# Patient Record
Sex: FEMALE | Race: WHITE | NOT HISPANIC OR LATINO | Employment: UNEMPLOYED | URBAN - METROPOLITAN AREA
[De-identification: names, ages, dates, MRNs, and addresses within clinical notes are randomized per-mention and may not be internally consistent; named-entity substitution may affect disease eponyms.]

---

## 2019-10-20 ENCOUNTER — HOSPITAL ENCOUNTER (EMERGENCY)
Facility: MEDICAL CENTER | Age: 39
End: 2019-10-20
Attending: EMERGENCY MEDICINE
Payer: MEDICAID

## 2019-10-20 VITALS
HEART RATE: 80 BPM | TEMPERATURE: 98 F | WEIGHT: 141.09 LBS | HEIGHT: 64 IN | SYSTOLIC BLOOD PRESSURE: 131 MMHG | OXYGEN SATURATION: 98 % | RESPIRATION RATE: 16 BRPM | BODY MASS INDEX: 24.09 KG/M2 | DIASTOLIC BLOOD PRESSURE: 95 MMHG

## 2019-10-20 DIAGNOSIS — Z76.0 MEDICATION REFILL: ICD-10-CM

## 2019-10-20 PROCEDURE — 99283 EMERGENCY DEPT VISIT LOW MDM: CPT

## 2019-10-20 RX ORDER — LISINOPRIL 20 MG/1
20 TABLET ORAL EVERY EVENING
Qty: 30 TAB | Refills: 0 | Status: SHIPPED | OUTPATIENT
Start: 2019-10-20 | End: 2019-11-19

## 2019-10-20 RX ORDER — LISINOPRIL 20 MG/1
20 TABLET ORAL EVERY EVENING
Status: SHIPPED | COMMUNITY
End: 2019-10-20 | Stop reason: SDUPTHER

## 2019-10-20 RX ORDER — HYDROCHLOROTHIAZIDE 25 MG/1
25 TABLET ORAL EVERY EVENING
Qty: 30 TAB | Refills: 0 | Status: SHIPPED | OUTPATIENT
Start: 2019-10-20 | End: 2019-11-19

## 2019-10-20 RX ORDER — HYDROCHLOROTHIAZIDE 25 MG/1
25 TABLET ORAL EVERY EVENING
Status: SHIPPED | COMMUNITY
End: 2019-10-20 | Stop reason: SDUPTHER

## 2019-10-20 SDOH — HEALTH STABILITY: MENTAL HEALTH: HOW OFTEN DO YOU HAVE A DRINK CONTAINING ALCOHOL?: NEVER

## 2019-10-20 NOTE — ED NOTES
Med rec updated and complete  Allergies reviewed  Pt reports no vitamins or OTC's.  Pt reports no antibiotics in the last 2 weeks  Pt is not sure what pharmacy to go too, just moved here.

## 2019-10-20 NOTE — ED NOTES
Pt cleared for d/c  dischg instructions given to pt  Rx lisinopril and HCTZ given  Pt aware to fill and take as prescribed  D/c'ed to home in NAD  To f/u w/ PCP for further needs

## 2019-10-20 NOTE — ED PROVIDER NOTES
"ED Provider Note    CHIEF COMPLAINT  Chief Complaint   Patient presents with   • Medication Refill       HPI  Saul Conde is a 39 y.o. female who presents for medication refill.  She ran out of her medications last night she is out of lisinopril 20 mg and hydrochlorthiazide 25 mg.  She has been on these medications for many years and is planning on following up with her primary care doctor.  She is in a \"program\" that helps her get on Medicaid and get a doctor.  She does get out of snf recently.  She has no symptoms otherwise      REVIEW OF SYSTEMS  Positive for medication refill, negative for chest pain shortness of breath.     PAST MEDICAL HISTORY   has a past medical history of Hypertension, Hypertension, and Pseudotumor cerebri.    SOCIAL HISTORY  Social History     Tobacco Use   • Smoking status: Never Smoker   • Smokeless tobacco: Never Used   Substance and Sexual Activity   • Alcohol use: Never     Frequency: Never   • Drug use: Never   • Sexual activity: Not on file       SURGICAL HISTORY  patient denies any surgical history    CURRENT MEDICATIONS  Home Medications     Reviewed by Cirilo Mckenzie (Pharmacy Tech) on 10/20/19 at 1608  Med List Status: Complete   Medication Last Dose Status   hydroCHLOROthiazide (HYDRODIURIL) 25 MG Tab 10/19/2019 Active   lisinopril (PRINIVIL) 20 MG Tab 10/19/2019 Active                ALLERGIES  Allergies   Allergen Reactions   • Iodine Anaphylaxis     Contrast with Iodine       PHYSICAL EXAM  VITAL SIGNS: /96   Pulse 80   Resp 18   Ht 1.626 m (5' 4\")   Wt 64 kg (141 lb 1.5 oz)   LMP 09/20/2019 (Approximate)   SpO2 99%   BMI 24.22 kg/m²   Constitutional: Alert in no apparent distress. Well apearing  HENT: Normocephalic, Atraumatic, Bilateral external ears normal. Nose normal.   Eyes:  Conjunctiva normal, non-icteric.   Lungs: Non-labored respirations  Skin: Warm, Dry, No erythema, No rash.   Neurologic: Alert, Grossly non-focal.   Psychiatric: " Affect normal, Judgment normal, Mood normal, Appears appropriate and not intoxicated.         DIAGNOSTIC STUDIES / PROCEDURES      COURSE & MEDICAL DECISION MAKING  Pertinent Labs & Imaging studies reviewed. (See chart for details)  This is a 39-year-old who presents for medication refill.  I will refill her medications she has a plan to get a primary care doctor within the month and will be able to get them filled that way in the future.     The patient will return for new or worsening symptoms and is stable at the time of discharge. Patient was given return precautions. Patient and/or family member verbalizes understanding and will comply.    DISPOSITION:  Patient will be discharged home in stable condition.    FOLLOW UP:  No follow-up provider specified.      OUTPATIENT MEDICATIONS:  Current Discharge Medication List              FINAL IMPRESSION  1. Medication refill         2.   3.     This dictation has been creating using voice recognition software. The accuracy of the dictation is limited the abilities of the software.  I expect there may be some errors of grammar and possibly content. I made every attempt to manually correct the errors within my dictation. However errors related to this voice recognition software may still exist and should be interpreted within the appropriate context.        The note accurately reflects work and decisions made by me.  Anya Bauer  10/20/2019  4:12 PM

## 2019-10-20 NOTE — ED TRIAGE NOTES
"Pt requests a refill for Lisinopril, and HCTZ.  She is current on her medication intake up to yesterday. Denies any adverse symptomatology.  She reports a hx of Pseudotumor Cerebri, and HTN.   Chief Complaint   Patient presents with   • Medication Refill     /96   Pulse 80   Resp 18   Ht 1.626 m (5' 4\")   Wt 64 kg (141 lb 1.5 oz)   LMP 09/20/2019 (Approximate)   SpO2 99%   BMI 24.22 kg/m²     "

## 2020-06-05 ENCOUNTER — HOSPITAL ENCOUNTER (EMERGENCY)
Facility: MEDICAL CENTER | Age: 40
End: 2020-06-05
Attending: EMERGENCY MEDICINE
Payer: MEDICAID

## 2020-06-05 VITALS
DIASTOLIC BLOOD PRESSURE: 103 MMHG | WEIGHT: 150 LBS | TEMPERATURE: 97.9 F | HEART RATE: 94 BPM | OXYGEN SATURATION: 97 % | BODY MASS INDEX: 25.61 KG/M2 | HEIGHT: 64 IN | SYSTOLIC BLOOD PRESSURE: 144 MMHG | RESPIRATION RATE: 17 BRPM

## 2020-06-05 DIAGNOSIS — I10 ESSENTIAL HYPERTENSION: ICD-10-CM

## 2020-06-05 DIAGNOSIS — G44.209 ACUTE NON INTRACTABLE TENSION-TYPE HEADACHE: ICD-10-CM

## 2020-06-05 DIAGNOSIS — G93.2 PSEUDOTUMOR CEREBRI: ICD-10-CM

## 2020-06-05 PROCEDURE — 700111 HCHG RX REV CODE 636 W/ 250 OVERRIDE (IP): Performed by: EMERGENCY MEDICINE

## 2020-06-05 PROCEDURE — 96372 THER/PROPH/DIAG INJ SC/IM: CPT

## 2020-06-05 PROCEDURE — 99284 EMERGENCY DEPT VISIT MOD MDM: CPT

## 2020-06-05 PROCEDURE — 700102 HCHG RX REV CODE 250 W/ 637 OVERRIDE(OP): Performed by: EMERGENCY MEDICINE

## 2020-06-05 PROCEDURE — A9270 NON-COVERED ITEM OR SERVICE: HCPCS | Performed by: EMERGENCY MEDICINE

## 2020-06-05 RX ORDER — LISINOPRIL 20 MG/1
20 TABLET ORAL DAILY
Qty: 30 TAB | Refills: 1 | Status: SHIPPED | OUTPATIENT
Start: 2020-06-05 | End: 2022-10-21

## 2020-06-05 RX ORDER — ACETAZOLAMIDE 250 MG/1
250 TABLET ORAL ONCE
Status: COMPLETED | OUTPATIENT
Start: 2020-06-05 | End: 2020-06-05

## 2020-06-05 RX ORDER — LISINOPRIL 20 MG/1
20 TABLET ORAL DAILY
COMMUNITY
End: 2020-06-05 | Stop reason: SDUPTHER

## 2020-06-05 RX ORDER — HYDROCHLOROTHIAZIDE 25 MG/1
25 TABLET ORAL DAILY
COMMUNITY
End: 2020-06-05 | Stop reason: SDUPTHER

## 2020-06-05 RX ORDER — ACETAZOLAMIDE 250 MG/1
250 TABLET ORAL 2 TIMES DAILY
Qty: 60 TAB | Refills: 1 | Status: SHIPPED | OUTPATIENT
Start: 2020-06-05 | End: 2022-10-21

## 2020-06-05 RX ORDER — KETOROLAC TROMETHAMINE 30 MG/ML
60 INJECTION, SOLUTION INTRAMUSCULAR; INTRAVENOUS ONCE
Status: DISCONTINUED | OUTPATIENT
Start: 2020-06-05 | End: 2020-06-05

## 2020-06-05 RX ORDER — KETOROLAC TROMETHAMINE 30 MG/ML
60 INJECTION, SOLUTION INTRAMUSCULAR; INTRAVENOUS ONCE
Status: COMPLETED | OUTPATIENT
Start: 2020-06-05 | End: 2020-06-05

## 2020-06-05 RX ORDER — HYDROCHLOROTHIAZIDE 25 MG/1
25 TABLET ORAL DAILY
Qty: 30 TAB | Refills: 1 | Status: SHIPPED | OUTPATIENT
Start: 2020-06-05 | End: 2022-10-21

## 2020-06-05 RX ADMIN — ACETAZOLAMIDE 250 MG: 250 TABLET ORAL at 11:06

## 2020-06-05 RX ADMIN — KETOROLAC TROMETHAMINE 60 MG: 30 INJECTION, SOLUTION INTRAMUSCULAR at 10:51

## 2020-06-05 NOTE — ED TRIAGE NOTES
"Chief Complaint   Patient presents with   • Headache     BIB [facility] from home for headache for a couple of days. Patient started feeling like \"I was having a hot flash, I was really warm and sweaty after I stood up.\"     /107   Pulse 91   Temp 36 °C (96.8 °F) (Temporal)   Resp 16   Ht 1.626 m (5' 4\")   Wt 68 kg (150 lb)   SpO2 100%   BMI 25.75 kg/m²     "

## 2020-06-05 NOTE — ED PROVIDER NOTES
"ED Provider Note    Scribed for James Santamaria M.D. by Sukhjinder Salcedo. 6/5/2020  10:07 AM    Means of arrival: EMS  History obtained from: Patient  History limited by: None    CHIEF COMPLAINT  Chief Complaint   Patient presents with   • Headache     BIB [facility] from home for headache for a couple of days. Patient started feeling like \"I was having a hot flash, I was really warm and sweaty after I stood up.\"       HPI  Saul Conde is a 39 y.o. female with a history of hypertension who presents to the Emergency Department complaining of a gradual onset 6/10 squeezing headache that began about 24 hours prior to my exam. The headache began about an hour after riding a bike in the heat and describes prodromal diaphoresis. The headache is localized to the top of her head and is worsening despite taking six Tylenol over the past 24 hours. It did improve for a short period of time at around 12:00 AM. The patient complains of persistent chills, diaphoresis, and fatigue. She denies fever, vision changes, light sensitivity,  nausea, vomiting, dental pain, jaw pain, calf pain, focal weakness or numbness. She is compliant with her lisinopril and HCTZ, although, she stop taking her Diamox 250 mg BID, prescribed for her pseudotumor cerebri, several months ago as she could no longer afford it. She describes chronic lower extremity swelling secondary to her pseudotumor. Her pseudotumor cerebri diagnosed in 1998. No family history of pseudotumor cerebri or migraines. Her last brain MRI and lumbar puncture were [place] many years ago. She is not followed by a Neurologist in [place]. No personal or family history of DVT or PE. No known COVID or other sick contacts. She had her influenza vaccine this year.    PPE Note: I personally donned full PPE for all patient encounters during this visit, including being clean-shaven with an N95 respirator mask, gloves, and goggles.     Scribe remained outside the patient's room " "and did not have any contact with the patient for the duration of patient encounter.    REVIEW OF SYSTEMS  Pertinent positives include: headache, diaphoresis, chills, and fatigue.  Pertinent negatives include: fever, vision changes, light sensitivity,  nausea, vomiting, dental pain, jaw pain, calf pain, focal weakness or numbness.  10+ systems reviewed and negative.      PAST MEDICAL HISTORY  Past Medical History:   Diagnosis Date   • Hypertension    • Hypertension    • Pseudotumor cerebri        FAMILY HISTORY  No DVT or PE.    SOCIAL HISTORY  Social History     Tobacco Use   • Smoking status: Never Smoker   • Smokeless tobacco: Never Used   Substance Use Topics   • Alcohol use: Never     Frequency: Never   • Drug use: Never     Social History     Substance and Sexual Activity   Drug Use Never       SURGICAL HISTORY  No recent surgeries    CURRENT MEDICATIONS  Home Medications     Reviewed by Lana Joya R.N. (Registered Nurse) on 06/05/20 at [facility] List Status: Partial   Medication Last Dose Status   hydroCHLOROthiazide (HYDRODIURIL) 25 MG Tab  Active   lisinopril (PRINIVIL) 20 MG Tab  Active                ALLERGIES  Allergies   Allergen Reactions   • Iodine Anaphylaxis     Contrast with Iodine       PHYSICAL EXAM  VITAL SIGNS: /107   Pulse 91   Temp 36 °C (96.8 °F) (Temporal)   Resp 16   Ht 1.626 m (5' 4\")   Wt 68 kg (150 lb)   SpO2 100%   BMI 25.75 kg/m²   Reviewed and hypertensive  Constitutional: Well developed, Well nourished, Appears uncomfortable, room light off.  HENT: Normocephalic, atraumatic, bilateral external ears normal, Patient wearing mask. No TMJ crepitus or tooth tenderness  Neck: No meningismus  Eyes: Pupils 4 mm equal and reactive, conjunctiva pink, no scleral icterus.   Cardiovascular: Regular rate and rhythm. No murmurs, rubs or gallops.   Respiratory: Lungs clear to auscultation bilaterally. No wheezes, rales, or rhonchi.  Abdominal:  Abdomen soft, non-tender, non " distended. No rebound, or guarding.    Skin: No erythema, no rash.   Genitourinary: No costovertebral angle tenderness.   Musculoskeletal: No edema .   Neurologic: Alert & oriented x 3, Cranial nerves II-XII are intact, Grasp, biceps, extensor hallucis longus, ankle plantar flexion are 5/5 and symmetric, Finger nose finger and fine motor normal. Sensation is intact to light touch in all 4 limbs.  No focal deficits noted.  Psychiatric: Affect normal, Judgment normal, Mood normal.    DIFFERENTIAL DIAGNOSIS:  Pseudotumor cerebri, tension headache, intracranial hemorrhage. Doubt meningitis, subarachnoid hemorrhage, hydrocephalus    INTERVENTIONS:  Medications   acetaZOLAMIDE (DIAMOX) tablet 250 mg (250 mg Oral Given 6/5/20 1106)   ketorolac (TORADOL) injection 60 mg (60 mg Intramuscular Given 6/5/20 1051)     Response: Reports her headache is nearly completely resolved.    ED COURSE:  Nursing notes, VS, PMSFHx reviewed in chart.     10:07 AM - Patient seen and examined at bedside. We discussed the need to restart her Diamox and we will provide her with resources. Patient will be treated with acetazolamide 250 mg and ketorolac 60 mg for her symptoms.    11:33 AM Patient was reevaluated at bedside. She feels greatly improved after medication. I discussed plans for discharge with a prescription for Diamox, Hydrodiuril, and Prinivil. She was given a referral to her PCP and instructed to return to the ED if her symptoms worsen. Patient understands and agrees.      MEDICAL DECISION MAKING:  Well-appearing patient presents with a moderate headache.  History of hypertension and pseudotumor cerebri noncompliant with Diamox.  There is no evidence of any neurologic deficit.  Her headache was easy to control with ketolorac.  I doubt she needs imaging or lumbar puncture.  This may be a tension headache or due to her pseudotumor.  Alternatively she may have an early viral syndrome.  Migraine is unlikely.  Subarachnoid hemorrhage  unlikely.    PLAN:  New Prescriptions    ACETAZOLAMIDE (DIAMOX) 250 MG TAB    Take 1 Tab by mouth 2 times a day.     Refill of hydrochlorothiazide and lisinopril  Pseudotumor cerebri handout given  Return for severe headache, thunderclap headache, headache and fever, headache and neurologic deficit, headache and vision change      your doctor, make appointment          CONDITION:  Patient discharged in good condition.     FINAL IMPRESSION  1. Acute non intractable tension-type headache    2. Pseudotumor cerebri    3. Essential hypertension          Sukhjinder GIBSON (Scribe), am scribing for, and in the presence of, James Santamaria M.D..    Electronically signed by: Sukhjinder Salcedo (Scribe), 6/5/2020    James GIBSON M.D. personally performed the services described in this documentation, as scribed by Sukhjinder Salcedo in my presence, and it is both accurate and complete.    C    The note accurately reflects work and decisions made by me.  James Santamaria M.D.  6/5/2020  4:43 PM

## 2020-06-05 NOTE — ED NOTES
Pt clear for d/c. Educated on d/c instructions, verbalized understanding. Patient changing out of hospital gown and attempting to contact boyfriend for ride home.  No questions or concerned at time of d/c.

## 2020-06-05 NOTE — DISCHARGE INSTRUCTIONS
Resume Diamox.  Take Ibuprofen for headache.  Follow-up with your new primary physician.  Return for sudden severe headache, headache and fever, headache and neurologic deficit.  Return especially for headache with vision changes.    You had a borderline or high normal blood pressure reading today.  This does not necessarily mean you have hypertension.  Please followup with your/a primary physician for comprehensive blood pressure evaluation and yearly fasting cholesterol assessment.  BP Readings from Last 3 Encounters:   06/05/20 151/107   10/20/19 131/95

## 2020-12-10 ENCOUNTER — HOSPITAL ENCOUNTER (EMERGENCY)
Facility: MEDICAL CENTER | Age: 40
End: 2020-12-10
Attending: EMERGENCY MEDICINE | Admitting: EMERGENCY MEDICINE
Payer: MEDICAID

## 2020-12-10 ENCOUNTER — APPOINTMENT (OUTPATIENT)
Dept: RADIOLOGY | Facility: MEDICAL CENTER | Age: 40
End: 2020-12-10
Attending: EMERGENCY MEDICINE
Payer: MEDICAID

## 2020-12-10 VITALS
TEMPERATURE: 96.9 F | OXYGEN SATURATION: 98 % | BODY MASS INDEX: 24.24 KG/M2 | WEIGHT: 141.98 LBS | RESPIRATION RATE: 16 BRPM | DIASTOLIC BLOOD PRESSURE: 78 MMHG | HEIGHT: 64 IN | HEART RATE: 90 BPM | SYSTOLIC BLOOD PRESSURE: 140 MMHG

## 2020-12-10 DIAGNOSIS — R51.9 ACUTE NONINTRACTABLE HEADACHE, UNSPECIFIED HEADACHE TYPE: ICD-10-CM

## 2020-12-10 LAB
ANION GAP SERPL CALC-SCNC: 9 MMOL/L (ref 7–16)
BASOPHILS # BLD AUTO: 0.3 % (ref 0–1.8)
BASOPHILS # BLD: 0.02 K/UL (ref 0–0.12)
BUN SERPL-MCNC: 13 MG/DL (ref 8–22)
CALCIUM SERPL-MCNC: 9.1 MG/DL (ref 8.5–10.5)
CHLORIDE SERPL-SCNC: 103 MMOL/L (ref 96–112)
CO2 SERPL-SCNC: 24 MMOL/L (ref 20–33)
CREAT SERPL-MCNC: 0.67 MG/DL (ref 0.5–1.4)
EKG IMPRESSION: NORMAL
EOSINOPHIL # BLD AUTO: 0.12 K/UL (ref 0–0.51)
EOSINOPHIL NFR BLD: 1.7 % (ref 0–6.9)
ERYTHROCYTE [DISTWIDTH] IN BLOOD BY AUTOMATED COUNT: 43.6 FL (ref 35.9–50)
GLUCOSE SERPL-MCNC: 85 MG/DL (ref 65–99)
HCT VFR BLD AUTO: 43.9 % (ref 37–47)
HGB BLD-MCNC: 14.6 G/DL (ref 12–16)
IMM GRANULOCYTES # BLD AUTO: 0.03 K/UL (ref 0–0.11)
IMM GRANULOCYTES NFR BLD AUTO: 0.4 % (ref 0–0.9)
LYMPHOCYTES # BLD AUTO: 0.94 K/UL (ref 1–4.8)
LYMPHOCYTES NFR BLD: 13 % (ref 22–41)
MCH RBC QN AUTO: 28.3 PG (ref 27–33)
MCHC RBC AUTO-ENTMCNC: 33.3 G/DL (ref 33.6–35)
MCV RBC AUTO: 85.2 FL (ref 81.4–97.8)
MONOCYTES # BLD AUTO: 0.39 K/UL (ref 0–0.85)
MONOCYTES NFR BLD AUTO: 5.4 % (ref 0–13.4)
NEUTROPHILS # BLD AUTO: 5.75 K/UL (ref 2–7.15)
NEUTROPHILS NFR BLD: 79.2 % (ref 44–72)
NRBC # BLD AUTO: 0 K/UL
NRBC BLD-RTO: 0 /100 WBC
PLATELET # BLD AUTO: 299 K/UL (ref 164–446)
PMV BLD AUTO: 9.3 FL (ref 9–12.9)
POTASSIUM SERPL-SCNC: 3.4 MMOL/L (ref 3.6–5.5)
RBC # BLD AUTO: 5.15 M/UL (ref 4.2–5.4)
SODIUM SERPL-SCNC: 136 MMOL/L (ref 135–145)
WBC # BLD AUTO: 7.3 K/UL (ref 4.8–10.8)

## 2020-12-10 PROCEDURE — 80048 BASIC METABOLIC PNL TOTAL CA: CPT

## 2020-12-10 PROCEDURE — 70450 CT HEAD/BRAIN W/O DYE: CPT

## 2020-12-10 PROCEDURE — 85025 COMPLETE CBC W/AUTO DIFF WBC: CPT

## 2020-12-10 PROCEDURE — 93005 ELECTROCARDIOGRAM TRACING: CPT | Performed by: EMERGENCY MEDICINE

## 2020-12-10 PROCEDURE — A9270 NON-COVERED ITEM OR SERVICE: HCPCS | Performed by: EMERGENCY MEDICINE

## 2020-12-10 PROCEDURE — 99283 EMERGENCY DEPT VISIT LOW MDM: CPT

## 2020-12-10 PROCEDURE — 700102 HCHG RX REV CODE 250 W/ 637 OVERRIDE(OP): Performed by: EMERGENCY MEDICINE

## 2020-12-10 PROCEDURE — 93005 ELECTROCARDIOGRAM TRACING: CPT

## 2020-12-10 RX ORDER — ACETAMINOPHEN 325 MG/1
650 TABLET ORAL ONCE
Status: COMPLETED | OUTPATIENT
Start: 2020-12-10 | End: 2020-12-10

## 2020-12-10 RX ADMIN — ACETAMINOPHEN 650 MG: 325 TABLET, FILM COATED ORAL at 13:41

## 2020-12-10 NOTE — ED TRIAGE NOTES
"Saul Conde  40 y.o. female  Chief Complaint   Patient presents with   • Headache     Patient brought in by EMS. Patient reports she woke up with a headache on top of her head one hour ago, patient describes pain as dull. Patient reports she had similiar pain the past, patient has history of migraines but states \"this pain is different\". Patient also reports shortness of breath. Patient reported it started 2 minutes after headache started.        Vitals:    12/10/20 1126   BP: 155/110   Pulse: 98   Resp: 16   Temp: 36.1 °C (96.9 °F)   SpO2: 100%       "

## 2020-12-10 NOTE — ED PROVIDER NOTES
"ED Provider Note    CHIEF COMPLAINT  Chief Complaint   Patient presents with   • Headache     Patient brought in by EMS. Patient reports she woke up with a headache on top of her head one hour ago, patient describes pain as dull. Patient reports she had similiar pain the past, patient has history of migraines but states \"this pain is different\". Patient also reports shortness of breath. Patient reported it started 2 minutes after headache started.        HPI  Saul Conde is a 40 y.o. female who presents to the emergency apartment for headache. Past medical history significant for pseudo-tumor cerebral, hypertension and intermittent headache. Today awoke with a dull headache to the top of her head which he states feels different than typical. She notes that she is not currently on her blood pressure medication but she has been compliant with her Diamox. Recently moved from [place] and no local PCP or neurologist. No recent illness. No fever chills. No nausea vomiting. No neck or back pain. She is not taking medication this morning for the pain although she states that Tylenol typically does help with her headaches. Pain is currently moderate. No light sensitivity.    REVIEW OF SYSTEMS  See HPI for further details. All other systems are negative.     PAST MEDICAL HISTORY   has a past medical history of Hypertension, Hypertension, and Pseudotumor cerebri.    SOCIAL HISTORY  Social History     Tobacco Use   • Smoking status: Never Smoker   • Smokeless tobacco: Never Used   Substance and Sexual Activity   • Alcohol use: Never     Frequency: Never   • Drug use: Never   • Sexual activity: Not on file       SURGICAL HISTORY  patient denies any surgical history    CURRENT MEDICATIONS  Home Medications     Reviewed by No Francis R.N. (Registered Nurse) on 12/10/20 at 1148  Med List Status: Partial   Medication Last Dose Status   acetaZOLAMIDE (DIAMOX) 250 MG Tab 12/9/2020 Active   hydroCHLOROthiazide " "(HYDRODIURIL) 25 MG Tab 11/29/20 Active   lisinopril (PRINIVIL) 20 MG Tab 11/29/20 Active                ALLERGIES  Allergies   Allergen Reactions   • Iodine Anaphylaxis     Contrast with Iodine       PHYSICAL EXAM  VITAL SIGNS: /110   Pulse 98   Temp 36.1 °C (96.9 °F) (Temporal)   Resp 16   Ht 1.626 m (5' 4\")   Wt 64.4 kg (141 lb 15.6 oz)   LMP 11/23/2020   SpO2 100%   BMI 24.37 kg/m²  @MONIE[[id number]::@   Pulse ox interpretation: I interpret this pulse ox as normal.  Constitutional: Alert in no apparent distress.  HENT: No signs of trauma, Bilateral external ears normal, Nose normal.   Eyes: Pupils are equal and reactive  Neck: Normal range of motion, No tenderness, Supple  Cardiovascular: Regular rate and rhythm, no murmurs.   Thorax & Lungs: Normal breath sounds, No respiratory distress, No wheezing, No chest tenderness.   Abdomen: Bowel sounds normal, Soft, No tenderness  Skin: Warm, Dry, No erythema, No rash.   Extremities: Intact distal pulses, No edema, No tenderness  Musculoskeletal: Good range of motion in all major joints. No tenderness to palpation or major deformities noted.   Neurologic: Alert , Normal motor function, Normal sensory function, No focal deficits noted.   Psychiatric: Affect normal, Judgment normal, Mood normal.       DIAGNOSTIC STUDIES / PROCEDURES      LABS  Results for orders placed or performed during the hospital encounter of 12/10/20   CBC WITH DIFFERENTIAL   Result Value Ref Range    WBC 7.3 4.8 - 10.8 K/uL    RBC 5.15 4.20 - 5.40 M/uL    Hemoglobin 14.6 12.0 - 16.0 g/dL    Hematocrit 43.9 37.0 - 47.0 %    MCV 85.2 81.4 - 97.8 fL    MCH 28.3 27.0 - 33.0 pg    MCHC 33.3 (L) 33.6 - 35.0 g/dL    RDW 43.6 35.9 - 50.0 fL    Platelet Count 299 164 - 446 K/uL    MPV 9.3 9.0 - 12.9 fL    Neutrophils-Polys 79.20 (H) 44.00 - 72.00 %    Lymphocytes 13.00 (L) 22.00 - 41.00 %    Monocytes 5.40 0.00 - 13.40 %    Eosinophils 1.70 0.00 - 6.90 %    Basophils 0.30 0.00 - 1.80 %    Immature " Granulocytes 0.40 0.00 - 0.90 %    Nucleated RBC 0.00 /100 WBC    Neutrophils (Absolute) 5.75 2.00 - 7.15 K/uL    Lymphs (Absolute) 0.94 (L) 1.00 - 4.80 K/uL    Monos (Absolute) 0.39 0.00 - 0.85 K/uL    Eos (Absolute) 0.12 0.00 - 0.51 K/uL    Baso (Absolute) 0.02 0.00 - 0.12 K/uL    Immature Granulocytes (abs) 0.03 0.00 - 0.11 K/uL    NRBC (Absolute) 0.00 K/uL   BASIC METABOLIC PANEL   Result Value Ref Range    Sodium 136 135 - 145 mmol/L    Potassium 3.4 (L) 3.6 - 5.5 mmol/L    Chloride 103 96 - 112 mmol/L    Co2 24 20 - 33 mmol/L    Glucose 85 65 - 99 mg/dL    Bun 13 8 - 22 mg/dL    Creatinine 0.67 0.50 - 1.40 mg/dL    Calcium 9.1 8.5 - 10.5 mg/dL    Anion Gap 9.0 7.0 - 16.0   ESTIMATED GFR   Result Value Ref Range    GFR If African American >60 >60 mL/min/1.73 m 2    GFR If Non African American >60 >60 mL/min/1.73 m 2   EKG   Result Value Ref Range    Report       [facility] Emergency Dept.    Test Date:  2020-12-10  Pt Name:    INDER OMALLEY               Department: ER  MRN:        [id number]                      Room:  Gender:     Female                       Technician: [id number]  :        [id number]                   Requested By:ER TRIAGE PROTOCOL  Order #:    [id number]                    Reading MD:    Measurements  Intervals                                Axis  Rate:       84                           P:          61  WY:         216                          QRS:        76  QRSD:       86                           T:          59  QT:         420  QTc:        497    Interpretive Statements  SINUS RHYTHM  FIRST DEGREE AV BLOCK  BORDERLINE PROLONGED QT INTERVAL  No previous ECG available for comparison           RADIOLOGY  CT-HEAD W/O   Final Result      No acute intracranial findings.               COURSE & MEDICAL DECISION MAKING  Pertinent Labs & Imaging studies reviewed. (See chart for details)  patient presented emergency department with the above complaint. 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 Laboratory evaluation is negative. CT imaging is negative. Patient feeling better after Tylenol. I have recommended ongoing home hydration, and follow-up for routine care with the new PCP as referred. She is understand return precautions to the ER if needed. She is currently off of her blood pressure medication but I do believe this can be handled by PCP once established.     The patient will return for worsening symptoms and is stable at the time of discharge. The patient verbalizes understanding and will comply.    FINAL IMPRESSION  1. Acute nonintractable headache, unspecified headache type            Electronically signed by: Manuel Coto M.D., 12/10/2020 12:33 PM

## 2020-12-10 NOTE — DISCHARGE PLANNING
CHW met with pt at bed side to introduce CCM. CHW offered to set patient up with a PCP at [facility]. Patient accepted CHW's services. CHW mentioned to patient that if this worker is unable to reach [facility] before she is discharged CHW will call patient to give patient her appointment information. Patient said that is fine and greatly appreciates CHW's help.

## 2021-12-12 ENCOUNTER — ANESTHESIA (OUTPATIENT)
Dept: SURGERY | Facility: MEDICAL CENTER | Age: 41
DRG: 854 | End: 2021-12-12
Payer: MEDICAID

## 2021-12-12 ENCOUNTER — ANESTHESIA EVENT (OUTPATIENT)
Dept: SURGERY | Facility: MEDICAL CENTER | Age: 41
DRG: 854 | End: 2021-12-12
Payer: MEDICAID

## 2021-12-12 ENCOUNTER — HOSPITAL ENCOUNTER (INPATIENT)
Facility: MEDICAL CENTER | Age: 41
LOS: 2 days | DRG: 854 | End: 2021-12-14
Attending: EMERGENCY MEDICINE | Admitting: STUDENT IN AN ORGANIZED HEALTH CARE EDUCATION/TRAINING PROGRAM
Payer: MEDICAID

## 2021-12-12 ENCOUNTER — APPOINTMENT (OUTPATIENT)
Dept: CARDIOLOGY | Facility: MEDICAL CENTER | Age: 41
DRG: 854 | End: 2021-12-12
Attending: INTERNAL MEDICINE
Payer: MEDICAID

## 2021-12-12 DIAGNOSIS — A40.1 SEPSIS DUE TO GROUP B STREPTOCOCCUS WITHOUT ACUTE ORGAN DYSFUNCTION (HCC): ICD-10-CM

## 2021-12-12 DIAGNOSIS — L03.011 CELLULITIS OF FINGER OF RIGHT HAND: ICD-10-CM

## 2021-12-12 DIAGNOSIS — L08.9 FINGER INFECTION: ICD-10-CM

## 2021-12-12 PROBLEM — L03.90 CELLULITIS: Status: ACTIVE | Noted: 2021-12-12

## 2021-12-12 PROBLEM — J12.82 PNEUMONIA DUE TO COVID-19 VIRUS: Status: ACTIVE | Noted: 2021-12-12

## 2021-12-12 PROBLEM — R78.81 BACTEREMIA: Status: ACTIVE | Noted: 2021-12-12

## 2021-12-12 PROBLEM — U07.1 PNEUMONIA DUE TO COVID-19 VIRUS: Status: ACTIVE | Noted: 2021-12-12

## 2021-12-12 PROBLEM — A41.9 SEPSIS (HCC): Status: ACTIVE | Noted: 2021-12-12

## 2021-12-12 PROBLEM — I10 HYPERTENSION: Status: ACTIVE | Noted: 2021-12-12

## 2021-12-12 LAB
ALBUMIN SERPL BCP-MCNC: 4.1 G/DL (ref 3.2–4.9)
ALBUMIN/GLOB SERPL: 1.1 G/DL
ALP SERPL-CCNC: 222 U/L (ref 30–99)
ALT SERPL-CCNC: 19 U/L (ref 2–50)
ANION GAP SERPL CALC-SCNC: 12 MMOL/L (ref 7–16)
AST SERPL-CCNC: 17 U/L (ref 12–45)
BASOPHILS # BLD AUTO: 0.2 % (ref 0–1.8)
BASOPHILS # BLD: 0.03 K/UL (ref 0–0.12)
BILIRUB SERPL-MCNC: 0.2 MG/DL (ref 0.1–1.5)
BUN SERPL-MCNC: 16 MG/DL (ref 8–22)
CALCIUM SERPL-MCNC: 9.5 MG/DL (ref 8.5–10.5)
CHLORIDE SERPL-SCNC: 102 MMOL/L (ref 96–112)
CO2 SERPL-SCNC: 22 MMOL/L (ref 20–33)
CREAT SERPL-MCNC: 0.66 MG/DL (ref 0.5–1.4)
EKG IMPRESSION: NORMAL
EOSINOPHIL # BLD AUTO: 0.22 K/UL (ref 0–0.51)
EOSINOPHIL NFR BLD: 1.5 % (ref 0–6.9)
ERYTHROCYTE [DISTWIDTH] IN BLOOD BY AUTOMATED COUNT: 46.1 FL (ref 35.9–50)
FLUAV RNA SPEC QL NAA+PROBE: NEGATIVE
FLUBV RNA SPEC QL NAA+PROBE: NEGATIVE
FUNGUS SPEC FUNGUS STN: NORMAL
GLOBULIN SER CALC-MCNC: 3.8 G/DL (ref 1.9–3.5)
GLUCOSE SERPL-MCNC: 82 MG/DL (ref 65–99)
HCG UR QL: NEGATIVE
HCT VFR BLD AUTO: 40.5 % (ref 37–47)
HGB BLD-MCNC: 13 G/DL (ref 12–16)
IMM GRANULOCYTES # BLD AUTO: 0.07 K/UL (ref 0–0.11)
IMM GRANULOCYTES NFR BLD AUTO: 0.5 % (ref 0–0.9)
LACTATE BLD-SCNC: 1.3 MMOL/L (ref 0.5–2)
LV EJECT FRACT  99904: 55
LV EJECT FRACT MOD 2C 99903: 50.86
LV EJECT FRACT MOD 4C 99902: 27.73
LV EJECT FRACT MOD BP 99901: 38.73
LYMPHOCYTES # BLD AUTO: 1.67 K/UL (ref 1–4.8)
LYMPHOCYTES NFR BLD: 11.6 % (ref 22–41)
MCH RBC QN AUTO: 26.1 PG (ref 27–33)
MCHC RBC AUTO-ENTMCNC: 32.1 G/DL (ref 33.6–35)
MCV RBC AUTO: 81.3 FL (ref 81.4–97.8)
MONOCYTES # BLD AUTO: 1.11 K/UL (ref 0–0.85)
MONOCYTES NFR BLD AUTO: 7.7 % (ref 0–13.4)
NEUTROPHILS # BLD AUTO: 11.26 K/UL (ref 2–7.15)
NEUTROPHILS NFR BLD: 78.5 % (ref 44–72)
NRBC # BLD AUTO: 0 K/UL
NRBC BLD-RTO: 0 /100 WBC
PLATELET # BLD AUTO: 345 K/UL (ref 164–446)
PMV BLD AUTO: 9.5 FL (ref 9–12.9)
POTASSIUM SERPL-SCNC: 3.8 MMOL/L (ref 3.6–5.5)
PROT SERPL-MCNC: 7.9 G/DL (ref 6–8.2)
RBC # BLD AUTO: 4.98 M/UL (ref 4.2–5.4)
RSV RNA SPEC QL NAA+PROBE: NEGATIVE
SARS-COV-2 RNA RESP QL NAA+PROBE: NOTDETECTED
SIGNIFICANT IND 70042: NORMAL
SITE SITE: NORMAL
SODIUM SERPL-SCNC: 136 MMOL/L (ref 135–145)
SOURCE SOURCE: NORMAL
SPECIMEN SOURCE: NORMAL
WBC # BLD AUTO: 14.4 K/UL (ref 4.8–10.8)

## 2021-12-12 PROCEDURE — 160036 HCHG PACU - EA ADDL 30 MINS PHASE I: Performed by: ORTHOPAEDIC SURGERY

## 2021-12-12 PROCEDURE — 770001 HCHG ROOM/CARE - MED/SURG/GYN PRIV*

## 2021-12-12 PROCEDURE — 93005 ELECTROCARDIOGRAM TRACING: CPT | Performed by: ANESTHESIOLOGY

## 2021-12-12 PROCEDURE — 160009 HCHG ANES TIME/MIN: Performed by: ORTHOPAEDIC SURGERY

## 2021-12-12 PROCEDURE — 83605 ASSAY OF LACTIC ACID: CPT

## 2021-12-12 PROCEDURE — 700111 HCHG RX REV CODE 636 W/ 250 OVERRIDE (IP): Performed by: ANESTHESIOLOGY

## 2021-12-12 PROCEDURE — 700102 HCHG RX REV CODE 250 W/ 637 OVERRIDE(OP): Performed by: STUDENT IN AN ORGANIZED HEALTH CARE EDUCATION/TRAINING PROGRAM

## 2021-12-12 PROCEDURE — 99253 IP/OBS CNSLTJ NEW/EST LOW 45: CPT | Mod: 57 | Performed by: ORTHOPAEDIC SURGERY

## 2021-12-12 PROCEDURE — 96365 THER/PROPH/DIAG IV INF INIT: CPT

## 2021-12-12 PROCEDURE — 87040 BLOOD CULTURE FOR BACTERIA: CPT | Mod: 91

## 2021-12-12 PROCEDURE — 99291 CRITICAL CARE FIRST HOUR: CPT

## 2021-12-12 PROCEDURE — 700102 HCHG RX REV CODE 250 W/ 637 OVERRIDE(OP): Performed by: INTERNAL MEDICINE

## 2021-12-12 PROCEDURE — 96367 TX/PROPH/DG ADDL SEQ IV INF: CPT

## 2021-12-12 PROCEDURE — A9270 NON-COVERED ITEM OR SERVICE: HCPCS | Performed by: STUDENT IN AN ORGANIZED HEALTH CARE EDUCATION/TRAINING PROGRAM

## 2021-12-12 PROCEDURE — 160035 HCHG PACU - 1ST 60 MINS PHASE I: Performed by: ORTHOPAEDIC SURGERY

## 2021-12-12 PROCEDURE — 87102 FUNGUS ISOLATION CULTURE: CPT

## 2021-12-12 PROCEDURE — 0241U HCHG SARS-COV-2 COVID-19 NFCT DS RESP RNA 4 TRGT MIC: CPT

## 2021-12-12 PROCEDURE — 26020 DRAIN HAND TENDON SHEATH: CPT | Mod: F7 | Performed by: ORTHOPAEDIC SURGERY

## 2021-12-12 PROCEDURE — 700111 HCHG RX REV CODE 636 W/ 250 OVERRIDE (IP): Performed by: EMERGENCY MEDICINE

## 2021-12-12 PROCEDURE — 501838 HCHG SUTURE GENERAL: Performed by: ORTHOPAEDIC SURGERY

## 2021-12-12 PROCEDURE — C9803 HOPD COVID-19 SPEC COLLECT: HCPCS | Performed by: EMERGENCY MEDICINE

## 2021-12-12 PROCEDURE — 700102 HCHG RX REV CODE 250 W/ 637 OVERRIDE(OP): Performed by: HOSPITALIST

## 2021-12-12 PROCEDURE — 80053 COMPREHEN METABOLIC PANEL: CPT

## 2021-12-12 PROCEDURE — 87075 CULTR BACTERIA EXCEPT BLOOD: CPT

## 2021-12-12 PROCEDURE — 93306 TTE W/DOPPLER COMPLETE: CPT | Mod: 26 | Performed by: INTERNAL MEDICINE

## 2021-12-12 PROCEDURE — 700105 HCHG RX REV CODE 258: Performed by: INTERNAL MEDICINE

## 2021-12-12 PROCEDURE — A9270 NON-COVERED ITEM OR SERVICE: HCPCS | Performed by: HOSPITALIST

## 2021-12-12 PROCEDURE — 160002 HCHG RECOVERY MINUTES (STAT): Performed by: ORTHOPAEDIC SURGERY

## 2021-12-12 PROCEDURE — 700111 HCHG RX REV CODE 636 W/ 250 OVERRIDE (IP): Performed by: STUDENT IN AN ORGANIZED HEALTH CARE EDUCATION/TRAINING PROGRAM

## 2021-12-12 PROCEDURE — 700105 HCHG RX REV CODE 258: Performed by: STUDENT IN AN ORGANIZED HEALTH CARE EDUCATION/TRAINING PROGRAM

## 2021-12-12 PROCEDURE — 96366 THER/PROPH/DIAG IV INF ADDON: CPT

## 2021-12-12 PROCEDURE — 36415 COLL VENOUS BLD VENIPUNCTURE: CPT

## 2021-12-12 PROCEDURE — 87205 SMEAR GRAM STAIN: CPT

## 2021-12-12 PROCEDURE — A6454 SELF-ADHER BAND W>=3" <5"/YD: HCPCS | Performed by: ORTHOPAEDIC SURGERY

## 2021-12-12 PROCEDURE — 0X9J0ZX DRAINAGE OF RIGHT HAND, OPEN APPROACH, DIAGNOSTIC: ICD-10-PCS | Performed by: ORTHOPAEDIC SURGERY

## 2021-12-12 PROCEDURE — 99223 1ST HOSP IP/OBS HIGH 75: CPT | Performed by: STUDENT IN AN ORGANIZED HEALTH CARE EDUCATION/TRAINING PROGRAM

## 2021-12-12 PROCEDURE — 93010 ELECTROCARDIOGRAM REPORT: CPT | Performed by: STUDENT IN AN ORGANIZED HEALTH CARE EDUCATION/TRAINING PROGRAM

## 2021-12-12 PROCEDURE — A9270 NON-COVERED ITEM OR SERVICE: HCPCS | Performed by: INTERNAL MEDICINE

## 2021-12-12 PROCEDURE — 0JBJ0ZZ EXCISION OF RIGHT HAND SUBCUTANEOUS TISSUE AND FASCIA, OPEN APPROACH: ICD-10-PCS | Performed by: ORTHOPAEDIC SURGERY

## 2021-12-12 PROCEDURE — 87070 CULTURE OTHR SPECIMN AEROBIC: CPT

## 2021-12-12 PROCEDURE — 700105 HCHG RX REV CODE 258: Performed by: EMERGENCY MEDICINE

## 2021-12-12 PROCEDURE — 160027 HCHG SURGERY MINUTES - 1ST 30 MINS LEVEL 2: Performed by: ORTHOPAEDIC SURGERY

## 2021-12-12 PROCEDURE — 700111 HCHG RX REV CODE 636 W/ 250 OVERRIDE (IP): Performed by: INTERNAL MEDICINE

## 2021-12-12 PROCEDURE — 160048 HCHG OR STATISTICAL LEVEL 1-5: Performed by: ORTHOPAEDIC SURGERY

## 2021-12-12 PROCEDURE — 85025 COMPLETE CBC W/AUTO DIFF WBC: CPT

## 2021-12-12 PROCEDURE — 93306 TTE W/DOPPLER COMPLETE: CPT

## 2021-12-12 PROCEDURE — 700111 HCHG RX REV CODE 636 W/ 250 OVERRIDE (IP): Performed by: HOSPITALIST

## 2021-12-12 PROCEDURE — 81025 URINE PREGNANCY TEST: CPT

## 2021-12-12 RX ORDER — HYDROCHLOROTHIAZIDE 25 MG/1
25 TABLET ORAL DAILY
Status: DISCONTINUED | OUTPATIENT
Start: 2021-12-12 | End: 2021-12-14 | Stop reason: HOSPADM

## 2021-12-12 RX ORDER — MIDAZOLAM HYDROCHLORIDE 1 MG/ML
INJECTION INTRAMUSCULAR; INTRAVENOUS PRN
Status: DISCONTINUED | OUTPATIENT
Start: 2021-12-12 | End: 2021-12-12 | Stop reason: HOSPADM

## 2021-12-12 RX ORDER — OXYCODONE HCL 5 MG/5 ML
5 SOLUTION, ORAL ORAL
Status: DISCONTINUED | OUTPATIENT
Start: 2021-12-12 | End: 2021-12-12 | Stop reason: HOSPADM

## 2021-12-12 RX ORDER — OXYCODONE HYDROCHLORIDE 10 MG/1
10 TABLET ORAL
Status: DISCONTINUED | OUTPATIENT
Start: 2021-12-12 | End: 2021-12-13

## 2021-12-12 RX ORDER — HYDROMORPHONE HYDROCHLORIDE 1 MG/ML
0.5 INJECTION, SOLUTION INTRAMUSCULAR; INTRAVENOUS; SUBCUTANEOUS
Status: DISCONTINUED | OUTPATIENT
Start: 2021-12-12 | End: 2021-12-12

## 2021-12-12 RX ORDER — POTASSIUM CHLORIDE 20 MEQ/1
20 TABLET, EXTENDED RELEASE ORAL ONCE
Status: COMPLETED | OUTPATIENT
Start: 2021-12-12 | End: 2021-12-12

## 2021-12-12 RX ORDER — SODIUM CHLORIDE, SODIUM LACTATE, POTASSIUM CHLORIDE, CALCIUM CHLORIDE 600; 310; 30; 20 MG/100ML; MG/100ML; MG/100ML; MG/100ML
INJECTION, SOLUTION INTRAVENOUS CONTINUOUS
Status: DISCONTINUED | OUTPATIENT
Start: 2021-12-12 | End: 2021-12-12 | Stop reason: HOSPADM

## 2021-12-12 RX ORDER — HEPARIN SODIUM 5000 [USP'U]/ML
5000 INJECTION, SOLUTION INTRAVENOUS; SUBCUTANEOUS EVERY 8 HOURS
Status: DISCONTINUED | OUTPATIENT
Start: 2021-12-12 | End: 2021-12-14 | Stop reason: HOSPADM

## 2021-12-12 RX ORDER — ONDANSETRON 2 MG/ML
4 INJECTION INTRAMUSCULAR; INTRAVENOUS
Status: DISCONTINUED | OUTPATIENT
Start: 2021-12-12 | End: 2021-12-12 | Stop reason: HOSPADM

## 2021-12-12 RX ORDER — HYDROMORPHONE HYDROCHLORIDE 1 MG/ML
0.5 INJECTION, SOLUTION INTRAMUSCULAR; INTRAVENOUS; SUBCUTANEOUS
Status: DISCONTINUED | OUTPATIENT
Start: 2021-12-12 | End: 2021-12-14 | Stop reason: HOSPADM

## 2021-12-12 RX ORDER — CHLORHEXIDINE GLUCONATE ORAL RINSE 1.2 MG/ML
15 SOLUTION DENTAL 3 TIMES DAILY
Status: DISCONTINUED | OUTPATIENT
Start: 2021-12-12 | End: 2021-12-13

## 2021-12-12 RX ORDER — SODIUM CHLORIDE, SODIUM LACTATE, POTASSIUM CHLORIDE, CALCIUM CHLORIDE 600; 310; 30; 20 MG/100ML; MG/100ML; MG/100ML; MG/100ML
INJECTION, SOLUTION INTRAVENOUS CONTINUOUS
Status: DISCONTINUED | OUTPATIENT
Start: 2021-12-12 | End: 2021-12-14 | Stop reason: HOSPADM

## 2021-12-12 RX ORDER — OXYCODONE HYDROCHLORIDE 5 MG/1
5 TABLET ORAL
Status: DISCONTINUED | OUTPATIENT
Start: 2021-12-12 | End: 2021-12-12

## 2021-12-12 RX ORDER — ACETAMINOPHEN 325 MG/1
650 TABLET ORAL EVERY 6 HOURS PRN
Status: DISCONTINUED | OUTPATIENT
Start: 2021-12-12 | End: 2021-12-14 | Stop reason: HOSPADM

## 2021-12-12 RX ORDER — HYDRALAZINE HYDROCHLORIDE 20 MG/ML
5 INJECTION INTRAMUSCULAR; INTRAVENOUS
Status: DISCONTINUED | OUTPATIENT
Start: 2021-12-12 | End: 2021-12-12 | Stop reason: HOSPADM

## 2021-12-12 RX ORDER — HALOPERIDOL 5 MG/ML
1 INJECTION INTRAMUSCULAR
Status: DISCONTINUED | OUTPATIENT
Start: 2021-12-12 | End: 2021-12-12 | Stop reason: HOSPADM

## 2021-12-12 RX ORDER — LISINOPRIL 20 MG/1
20 TABLET ORAL DAILY
Status: DISCONTINUED | OUTPATIENT
Start: 2021-12-12 | End: 2021-12-14 | Stop reason: HOSPADM

## 2021-12-12 RX ORDER — ACETAZOLAMIDE 250 MG/1
500 TABLET ORAL DAILY
Status: DISCONTINUED | OUTPATIENT
Start: 2021-12-13 | End: 2021-12-14 | Stop reason: HOSPADM

## 2021-12-12 RX ORDER — LABETALOL HYDROCHLORIDE 5 MG/ML
5 INJECTION, SOLUTION INTRAVENOUS
Status: DISCONTINUED | OUTPATIENT
Start: 2021-12-12 | End: 2021-12-12 | Stop reason: HOSPADM

## 2021-12-12 RX ORDER — OXYCODONE HYDROCHLORIDE 10 MG/1
10 TABLET ORAL
Status: DISCONTINUED | OUTPATIENT
Start: 2021-12-12 | End: 2021-12-12

## 2021-12-12 RX ORDER — HYDRALAZINE HYDROCHLORIDE 20 MG/ML
20 INJECTION INTRAMUSCULAR; INTRAVENOUS EVERY 6 HOURS PRN
Status: DISCONTINUED | OUTPATIENT
Start: 2021-12-12 | End: 2021-12-14 | Stop reason: HOSPADM

## 2021-12-12 RX ORDER — OXYCODONE HCL 5 MG/5 ML
10 SOLUTION, ORAL ORAL
Status: DISCONTINUED | OUTPATIENT
Start: 2021-12-12 | End: 2021-12-12 | Stop reason: HOSPADM

## 2021-12-12 RX ORDER — LABETALOL HYDROCHLORIDE 5 MG/ML
10 INJECTION, SOLUTION INTRAVENOUS EVERY 4 HOURS PRN
Status: DISCONTINUED | OUTPATIENT
Start: 2021-12-12 | End: 2021-12-14 | Stop reason: HOSPADM

## 2021-12-12 RX ADMIN — MIDAZOLAM HYDROCHLORIDE 2 MG: 1 INJECTION, SOLUTION INTRAMUSCULAR; INTRAVENOUS at 12:45

## 2021-12-12 RX ADMIN — OXYCODONE HYDROCHLORIDE 10 MG: 10 TABLET ORAL at 18:23

## 2021-12-12 RX ADMIN — CHLORHEXIDINE GLUCONATE 0.12% ORAL RINSE 15 ML: 1.2 LIQUID ORAL at 20:37

## 2021-12-12 RX ADMIN — HYDRALAZINE HYDROCHLORIDE 20 MG: 20 INJECTION INTRAMUSCULAR; INTRAVENOUS at 18:01

## 2021-12-12 RX ADMIN — HYDROCHLOROTHIAZIDE 25 MG: 25 TABLET ORAL at 05:05

## 2021-12-12 RX ADMIN — HEPARIN SODIUM 5000 UNITS: 5000 INJECTION, SOLUTION INTRAVENOUS; SUBCUTANEOUS at 05:05

## 2021-12-12 RX ADMIN — HYDROMORPHONE HYDROCHLORIDE 0.5 MG: 1 INJECTION, SOLUTION INTRAMUSCULAR; INTRAVENOUS; SUBCUTANEOUS at 17:00

## 2021-12-12 RX ADMIN — ACETAMINOPHEN 650 MG: 325 TABLET, FILM COATED ORAL at 15:06

## 2021-12-12 RX ADMIN — AMPICILLIN SODIUM AND SULBACTAM SODIUM 3 G: 2; 1 INJECTION, POWDER, FOR SOLUTION INTRAMUSCULAR; INTRAVENOUS at 15:07

## 2021-12-12 RX ADMIN — VANCOMYCIN HYDROCHLORIDE 1500 MG: 500 INJECTION, POWDER, LYOPHILIZED, FOR SOLUTION INTRAVENOUS at 04:18

## 2021-12-12 RX ADMIN — SODIUM CHLORIDE, POTASSIUM CHLORIDE, SODIUM LACTATE AND CALCIUM CHLORIDE: 600; 310; 30; 20 INJECTION, SOLUTION INTRAVENOUS at 06:54

## 2021-12-12 RX ADMIN — OXYCODONE HYDROCHLORIDE 15 MG: 10 TABLET ORAL at 22:46

## 2021-12-12 RX ADMIN — HYDRALAZINE HYDROCHLORIDE 5 MG: 20 INJECTION INTRAMUSCULAR; INTRAVENOUS at 13:54

## 2021-12-12 RX ADMIN — ACETAMINOPHEN 650 MG: 325 TABLET, FILM COATED ORAL at 05:10

## 2021-12-12 RX ADMIN — HEPARIN SODIUM 5000 UNITS: 5000 INJECTION, SOLUTION INTRAVENOUS; SUBCUTANEOUS at 22:46

## 2021-12-12 RX ADMIN — FENTANYL CITRATE 50 MCG: 50 INJECTION, SOLUTION INTRAMUSCULAR; INTRAVENOUS at 12:45

## 2021-12-12 RX ADMIN — OXYCODONE HYDROCHLORIDE 10 MG: 10 TABLET ORAL at 15:23

## 2021-12-12 RX ADMIN — POTASSIUM CHLORIDE 20 MEQ: 20 TABLET, EXTENDED RELEASE ORAL at 08:23

## 2021-12-12 RX ADMIN — HYDROMORPHONE HYDROCHLORIDE 0.5 MG: 1 INJECTION, SOLUTION INTRAMUSCULAR; INTRAVENOUS; SUBCUTANEOUS at 16:39

## 2021-12-12 RX ADMIN — AMPICILLIN SODIUM AND SULBACTAM SODIUM 3 G: 2; 1 INJECTION, POWDER, FOR SOLUTION INTRAMUSCULAR; INTRAVENOUS at 03:36

## 2021-12-12 RX ADMIN — AMPICILLIN SODIUM AND SULBACTAM SODIUM 3 G: 2; 1 INJECTION, POWDER, FOR SOLUTION INTRAMUSCULAR; INTRAVENOUS at 22:46

## 2021-12-12 RX ADMIN — LISINOPRIL 20 MG: 20 TABLET ORAL at 05:05

## 2021-12-12 RX ADMIN — HYDROMORPHONE HYDROCHLORIDE 0.5 MG: 1 INJECTION, SOLUTION INTRAMUSCULAR; INTRAVENOUS; SUBCUTANEOUS at 20:37

## 2021-12-12 ASSESSMENT — ENCOUNTER SYMPTOMS
CHILLS: 0
DIARRHEA: 0
RESPIRATORY NEGATIVE: 1
CARDIOVASCULAR NEGATIVE: 1
BLURRED VISION: 0
HEADACHES: 0
ABDOMINAL PAIN: 0
PALPITATIONS: 0
CONSTIPATION: 0
DEPRESSION: 0
GASTROINTESTINAL NEGATIVE: 1
SHORTNESS OF BREATH: 0
NERVOUS/ANXIOUS: 0
EYES NEGATIVE: 1
VOMITING: 0
NAUSEA: 0
COUGH: 0
SORE THROAT: 0
CHILLS: 1
NEUROLOGICAL NEGATIVE: 1
FEVER: 1
DIZZINESS: 0
MUSCULOSKELETAL NEGATIVE: 1
MYALGIAS: 1
WEAKNESS: 0
PSYCHIATRIC NEGATIVE: 1
FEVER: 0

## 2021-12-12 ASSESSMENT — PAIN SCALES - PAIN ASSESSMENT IN ADVANCED DEMENTIA (PAINAD)
BREATHING: NORMAL
BODYLANGUAGE: RELAXED
CONSOLABILITY: NO NEED TO CONSOLE
FACIALEXPRESSION: SMILING OR INEXPRESSIVE
TOTALSCORE: 0

## 2021-12-12 ASSESSMENT — PAIN DESCRIPTION - PAIN TYPE
TYPE: SURGICAL PAIN
TYPE: SURGICAL PAIN;ACUTE PAIN
TYPE: SURGICAL PAIN

## 2021-12-12 ASSESSMENT — COGNITIVE AND FUNCTIONAL STATUS - GENERAL
SUGGESTED CMS G CODE MODIFIER MOBILITY: CH
SUGGESTED CMS G CODE MODIFIER DAILY ACTIVITY: CH
DAILY ACTIVITIY SCORE: 24
MOBILITY SCORE: 24

## 2021-12-12 ASSESSMENT — LIFESTYLE VARIABLES
TOTAL SCORE: 0
AVERAGE NUMBER OF DAYS PER WEEK YOU HAVE A DRINK CONTAINING ALCOHOL: 0
ON A TYPICAL DAY WHEN YOU DRINK ALCOHOL HOW MANY DRINKS DO YOU HAVE: 0
HAVE PEOPLE ANNOYED YOU BY CRITICIZING YOUR DRINKING: NO
CONSUMPTION TOTAL: NEGATIVE
HOW MANY TIMES IN THE PAST YEAR HAVE YOU HAD 5 OR MORE DRINKS IN A DAY: 0
TOTAL SCORE: 0
EVER FELT BAD OR GUILTY ABOUT YOUR DRINKING: NO
DOES PATIENT WANT TO STOP DRINKING: NO
HAVE YOU EVER FELT YOU SHOULD CUT DOWN ON YOUR DRINKING: NO
TOTAL SCORE: 0
EVER HAD A DRINK FIRST THING IN THE MORNING TO STEADY YOUR NERVES TO GET RID OF A HANGOVER: NO
DO YOU DRINK ALCOHOL: NO

## 2021-12-12 ASSESSMENT — PATIENT HEALTH QUESTIONNAIRE - PHQ9
1. LITTLE INTEREST OR PLEASURE IN DOING THINGS: NOT AT ALL
2. FEELING DOWN, DEPRESSED, IRRITABLE, OR HOPELESS: NOT AT ALL
SUM OF ALL RESPONSES TO PHQ9 QUESTIONS 1 AND 2: 0

## 2021-12-12 ASSESSMENT — FIBROSIS 4 INDEX: FIB4 SCORE: 0.47

## 2021-12-12 ASSESSMENT — PAIN DESCRIPTION - DESCRIPTORS: DESCRIPTORS: ACHING;BURNING

## 2021-12-12 ASSESSMENT — PAIN SCALES - GENERAL: PAIN_LEVEL: 0

## 2021-12-12 NOTE — ED PROVIDER NOTES
ED Provider Note        Primary care provider: Pcp Pt States None    I verified that the patient was wearing a mask and I was wearing appropriate PPE every time I entered the room. The patient's mask was on the patient at all times during my encounter except for a brief view of the oropharynx.      CHIEF COMPLAINT  Chief Complaint   Patient presents with   • Digit Pain     Patient has infected Right middle finger, pt reports she was septic. Patient has been in [facility] recieving IV abx. Patient reports she was suppose to have surgery on her R middle finger for it to be possibly drained. Patient reports that the doctor has ripped the skin off her finger. Patient was at [facility] yesterday.        HPI  Saul Conde is a 41 y.o. female who presents to the Emergency Department with chief complaint of digit pain.  [facility] right [facility] and into the right long finger.  Orthopedics was consulted at outside facility recommended irrigation and drainage however patient was refusing surgery and did not like the treatment that she was getting at that facility she left there AMA and presented here.  She reports that since the IV antibiotics were initiated the swelling in her fingers got much better the pain has gotten better redness seems to be going down as well.  Still having slight chills no measured fever no headache altered mental status cough congestion chest pain or shortness of breath no abdominal pain no problems with urination or bowel movements she denies chance of pregnancy at this time.  Patient does state that she did test positive for Covid at the outlying facility.  She denies having symptoms of such that    REVIEW OF SYSTEMS  10 systems reviewed and otherwise negative, pertinent positives and negatives listed in the history of present illness.    PAST MEDICAL HISTORY   has a past medical history of Hypertension,  "Hypertension, and Pseudotumor cerebri.    SURGICAL HISTORY  patient denies any surgical history    SOCIAL HISTORY  Social History     Tobacco Use   • Smoking status: Never Smoker   • Smokeless tobacco: Never Used   Vaping Use   • Vaping Use: Never used   Substance Use Topics   • Alcohol use: Yes   • Drug use: Yes     Comment: meth and weed      Social History     Substance and Sexual Activity   Drug Use Yes    Comment: meth and weed       FAMILY HISTORY  Non-Contributory    CURRENT MEDICATIONS  Home Medications    **Home medications have not yet been reviewed for this encounter**         ALLERGIES  Allergies   Allergen Reactions   • Iodine Anaphylaxis     Contrast with Iodine       PHYSICAL EXAM  VITAL SIGNS: /100   Pulse (!) 106   Temp 36.5 °C (97.7 °F) (Temporal)   Resp 16   Ht 1.626 m (5' 4\")   Wt 60 kg (132 lb 4.4 oz)   SpO2 97%   BMI 22.71 kg/m²   Pulse ox interpretation: I interpret this pulse ox as normal.  Constitutional: Alert and oriented x 3, Distress  HEENT: Atraumatic normocephalic, pupils are equal round, extraocular movements are intact. The nares is clear, external ears are normal, mouth shows moist mucous membranes  Neck: no obvious JVD or tracheal deviation  Cardiovascular: Regular rate and rhythm no murmur rub or gallop   Thorax & Lungs: No respiratory distress, no wheezes rales or rhonchi, No chest tenderness.   GI: Soft nontender nondistended positive bowel sounds, no peritoneal signs  Skin: Patient has circumferential erythema of the entire long finger of the right hand.  There is minor fusiform swelling there is no tenderness to palpation of the flexor tendon sheath she is able to flex and extend however this does exacerbate pain and she has minimal pain with passive flexion and extension the erythema and induration extends into the dorsum of the hand there is no palmar involvement no involvement of any other digit.  There is no proximal streaking or " lymphadenopathy.  Musculoskeletal: Moving all extremities with normal range strength, no acute  deformity  Neurologic: Cranial nerves III through XII are grossly intact, no sensory deficit, no cerebellar dysfunction   Psychiatric: Appropriate affect for situation at this time      DIAGNOSTIC STUDIES / PROCEDURES  LABS      Results for orders placed or performed during the hospital encounter of 12/12/21   LACTIC ACID   Result Value Ref Range    Lactic Acid 1.3 0.5 - 2.0 mmol/L   CBC WITH DIFFERENTIAL   Result Value Ref Range    WBC 14.4 (H) 4.8 - 10.8 K/uL    RBC 4.98 4.20 - 5.40 M/uL    Hemoglobin 13.0 12.0 - 16.0 g/dL    Hematocrit 40.5 37.0 - 47.0 %    MCV 81.3 (L) 81.4 - 97.8 fL    MCH 26.1 (L) 27.0 - 33.0 pg    MCHC 32.1 (L) 33.6 - 35.0 g/dL    RDW 46.1 35.9 - 50.0 fL    Platelet Count 345 164 - 446 K/uL    MPV 9.5 9.0 - 12.9 fL    Neutrophils-Polys 78.50 (H) 44.00 - 72.00 %    Lymphocytes 11.60 (L) 22.00 - 41.00 %    Monocytes 7.70 0.00 - 13.40 %    Eosinophils 1.50 0.00 - 6.90 %    Basophils 0.20 0.00 - 1.80 %    Immature Granulocytes 0.50 0.00 - 0.90 %    Nucleated RBC 0.00 /100 WBC    Neutrophils (Absolute) 11.26 (H) 2.00 - 7.15 K/uL    Lymphs (Absolute) 1.67 1.00 - 4.80 K/uL    Monos (Absolute) 1.11 (H) 0.00 - 0.85 K/uL    Eos (Absolute) 0.22 0.00 - 0.51 K/uL    Baso (Absolute) 0.03 0.00 - 0.12 K/uL    Immature Granulocytes (abs) 0.07 0.00 - 0.11 K/uL    NRBC (Absolute) 0.00 K/uL       All labs reviewed by me.      RADIOLOGY  No orders to display         COURSE & MEDICAL DECISION MAKING  Pertinent Labs & Imaging studies reviewed. (See chart for details)    2:20 AM - Patient seen and examined at bedside.       Patient noted to have slightly elevated blood pressure likely circumstantial secondary to presenting complaint. Referred to primary care physician for further evaluation.      Medical Decision Making: Patient slightly tachycardic at arrival with source of infection concerning for sepsis she is  "initiated sepsis protocol.  she did have IV antibiotics at outside hospital labs including blood cultures were obtained at outside hospital preliminary results of blood culture showed gram-positive cocci in chains.  Patient does have some fusiform swelling of this digit however she is able to flex and extend there is no tenderness along the flexor sheath.  Orthopedic consult at outside hospital was in agreement that did not likely have flexor tenosynovitis at this point.  She is showing vast improvement with antibiotic treatment I do not think there is indication for repeat consultation at this hour.  Patient given Vanco and Zosyn here should be admitted to hospitalist for further evaluation and treatment.  Admitted in guarded condition.  /100   Pulse (!) 106   Temp 36.5 °C (97.7 °F) (Temporal)   Resp 16   Ht 1.626 m (5' 4\")   Wt 60 kg (132 lb 4.4 oz)   SpO2 97%   BMI 22.71 kg/m²         FINAL IMPRESSION  1.  Cellulitis right hand  2.  Sepsis, simple    This dictation has been created using voice recognition software and/or scribes. The accuracy of the dictation is limited by the abilities of the software and the expertise of the scribes. I expect there may be some errors of grammar and possibly content. I made every attempt to manually correct the errors within my dictation. However, errors related to voice recognition software and/or scribes may still exist and should be interpreted within the appropriate context.            "

## 2021-12-12 NOTE — ED NOTES
Report to ZAHIRA Schwartz. Patient placed on transport list and is ready to transport to [place] [id number]

## 2021-12-12 NOTE — ED TRIAGE NOTES
"Chief Complaint   Patient presents with   • Digit Pain     Patient has infected Right middle finger, pt reports she was septic. Patient has been in [facility] recieving IV abx. Patient reports she was suppose to have surgery on her R middle finger for it to be possibly drained. Patient reports that the doctor has ripped the skin off her finger. Patient was at [facility] yesterday.        42 yo Fto triage for above complaint. Patient cut her R middle finger with a knife a couple of weeks ago and it became infected. Patient was seen at  [facility] yesterday recieving IV abx and per pt she was septic. Patient reports she was suppose to have surgery on her R middle finger for it to be possibly drained. Pt states in the ER, they numbed up her finger and tore a layer off her skin. Pt has been worried to receive surgery. Pt's right middle finger is swollen and red. Pain 8/10. GCS 15.     Pt is alert and oriented, speaking in full sentences, follows commands and responds appropriately to questions. NAD. Resp are even and unlabored.      Pt placed in lobby. Pt educated on triage process. Pt encouraged to alert staff for any changes.     Patient and staff wearing appropriate PPE    /100   Pulse (!) 106   Temp 36.5 °C (97.7 °F) (Temporal)   Resp 16   Ht 1.626 m (5' 4\")   Wt 60 kg (132 lb 4.4 oz)   SpO2 97%   BMI 22.71 kg/m²   "

## 2021-12-12 NOTE — ANESTHESIA TIME REPORT
Anesthesia Start and Stop Event Times     Date Time Event    12/12/2021 1229 Ready for Procedure     1243 Anesthesia Start     1312 Anesthesia Stop        Responsible Staff  12/12/21    Name Role Begin End    Roya Tucker M.D. Anesth 1243 1312        Preop Diagnosis (Free Text):  Pre-op Diagnosis     RIGHT MIDDLE FINGER INFECTION         Preop Diagnosis (Codes):    Premium Reason  E. Weekend    Comments:

## 2021-12-12 NOTE — ANESTHESIA PREPROCEDURE EVALUATION
Case: [id number] Date/Time: 12/12/21 1245    Procedure: IRRIGATION AND DEBRIDEMENT, WOUND (Right Middle Finger)    Anesthesia type: General    Location: [facility] OR 12 / SURGERY [facility]    Surgeons: Diego Vinson M.D.        42 yo F with history of HTN, pseudotumor cerebri and meth abuse here for right middle finger I&D.  Apparently the pt sought care at [facility] and left AMA after she tested positive for COVID.  COVID PCR negative here.  Last meth use 1 week ago    NPO  B-HCG neg  Relevant Problems   PULMONARY   (positive) Pneumonia due to COVID-19 virus      CARDIAC   (positive) Hypertension       Physical Exam    Airway   Mallampati: II  TM distance: >3 FB  Neck ROM: full       Cardiovascular - normal exam  Rhythm: regular  Rate: normal  (-) murmur     Dental - normal exam           Pulmonary - normal exam  Breath sounds clear to auscultation     Abdominal    Neurological - normal exam                 Anesthesia Plan    ASA 3- EMERGENT   ASA physical status 3 criteria: alcohol and/or substance dependence or abuseASA physical status emergent criteria: acutely contaminated wound or identified infection source    Plan - MAC               Induction: intravenous    Postoperative Plan: Postoperative administration of opioids is intended.    Pertinent diagnostic labs and testing reviewed    Informed Consent:    Anesthetic plan and risks discussed with patient.

## 2021-12-12 NOTE — PROGRESS NOTES
Patient arrived to T332/2  Ambulated from [facility] to bed with SBA  Patient is very tearful and asked to complete the admission profile and skin check at a later time    Pt is A&O x4, educated on use of call light  Tolerating a regular diet   Dressings in place to Right middle finger CDI     Last BM PTA  Up with SBA   Bed alarm ON, patient is LOW fall risk per Thais Richard  Reviewed plan of care with patient, bed in lowest position and locked, pt resting comfortably now, call light within reach, all needs met at this time. Interventions will be executed per plan of care.

## 2021-12-12 NOTE — H&P
Hospital Medicine History & Physical Note    Date of Service  12/12/2021    Primary Care Physician  Pcp Pt States None    Consultants  None    Code Status  Full Code    Chief Complaint  Chief Complaint   Patient presents with   • Digit Pain     Patient has infected Right middle finger, pt reports she was septic. Patient has been in [facility] recieving IV abx. Patient reports she was suppose to have surgery on her R middle finger for it to be possibly drained. Patient reports that the doctor has ripped the skin off her finger. Patient was at [facility] yesterday.        History of Presenting Illness  Saul Conde is a 41 y.o. female who presented 12/12/2021 with fevers pain and swelling in her right middle finger for several days.  States that she has had a wound there for about 2 weeks, however she bumped it a week ago, and since then has become more swollen and tender.  For the last few days, she is reported fever.  She decided to present to [facility] and was every 24 hours.  She was given Rocephin, and reported symptomatic relief after be administered antibiotics.  Blood cultures grew gram-positive cocci in chains.  She was referred to orthopedic surgeon for incision and drainage, however refused as she felt uncomfortable with the service there and left AMA.    Patient was found to be positive for Covid there.  She is asymptomatic.  She is not vaccinated.    In the ED, patient found to be tachycardic.  Remarkable labs include neutrophilic leukocytosis, alkaline phosphatase 222, lactic acid negative.  Patient was given vancomycin and Unasyn in ED. patient admitted to medicine for sepsis secondary to cellulitis.    I discussed the plan of care with patient.    Review of Systems  Review of Systems   Constitutional: Positive for chills and fever.   HENT: Negative.    Eyes: Negative.    Respiratory: Negative.    Cardiovascular: Negative.    Gastrointestinal: Negative.     Genitourinary: Negative.    Musculoskeletal: Negative.    Skin: Negative.    Neurological: Negative.    Endo/Heme/Allergies: Negative.    Psychiatric/Behavioral: Negative.        Past Medical History   has a past medical history of Hypertension, Hypertension, and Pseudotumor cerebri.    Surgical History  No pertinent surgical history    Family History   Family history reviewed with patient. There is no family history that is pertinent to the chief complaint.     Social History   reports that she has never smoked. She has never used smokeless tobacco. She reports current alcohol use. She reports current drug use.    Allergies  Allergies   Allergen Reactions   • Iodine Anaphylaxis     Contrast with Iodine       Medications  Prior to Admission Medications   Prescriptions Last Dose Informant Patient Reported? Taking?   acetaZOLAMIDE (DIAMOX) 250 MG Tab   No No   Sig: Take 1 Tab by mouth 2 times a day.   hydroCHLOROthiazide (HYDRODIURIL) 25 MG Tab   No No   Sig: Take 1 Tab by mouth every day.   lisinopril (PRINIVIL) 20 MG Tab   No No   Sig: Take 1 Tab by mouth every day.      Facility-Administered Medications: None       Physical Exam  Temp:  [36.5 °C (97.7 °F)] 36.5 °C (97.7 °F)  Pulse:  [] 90  Resp:  [16] 16  BP: (132-148)/() 148/92  SpO2:  [97 %] 97 %  Blood Pressure: 148/92   Temperature: 36.5 °C (97.7 °F)   Pulse: 90   Respiration: 16   Pulse Oximetry: 97 %       Physical Exam  Constitutional:       Appearance: Normal appearance. She is normal weight.   HENT:      Head: Normocephalic.      Nose: Nose normal.   Eyes:      Pupils: Pupils are equal, round, and reactive to light.   Cardiovascular:      Rate and Rhythm: Regular rhythm. Tachycardia present.      Pulses: Normal pulses.   Pulmonary:      Effort: Pulmonary effort is normal.      Breath sounds: Normal breath sounds.   Abdominal:      General: Abdomen is flat. Bowel sounds are normal.      Palpations: Abdomen is soft.   Musculoskeletal:          General: Normal range of motion.      Cervical back: Normal range of motion.      Comments: Right middle finger erythematous and warm to touch.  Appears swollen.  Radial pulse 2+ bilaterally.  No drainage foul odor oozing is noted.   Skin:     General: Skin is warm.   Neurological:      General: No focal deficit present.      Mental Status: She is alert and oriented to person, place, and time. Mental status is at baseline.   Psychiatric:         Mood and Affect: Mood normal.         Laboratory:  Recent Labs     12/12/21  0242   WBC 14.4*   RBC 4.98   HEMOGLOBIN 13.0   HEMATOCRIT 40.5   MCV 81.3*   MCH 26.1*   MCHC 32.1*   RDW 46.1   PLATELETCT 345   MPV 9.5     Recent Labs     12/12/21 0242   SODIUM 136   POTASSIUM 3.8   CHLORIDE 102   CO2 22   GLUCOSE 82   BUN 16   CREATININE 0.66   CALCIUM 9.5     Recent Labs     12/12/21  0242   ALTSGPT 19   ASTSGOT 17   ALKPHOSPHAT 222*   TBILIRUBIN 0.2   GLUCOSE 82         No results for input(s): NTPROBNP in the last 72 hours.      No results for input(s): TROPONINT in the last 72 hours.    Imaging:  No orders to display       no X-Ray or EKG requiring interpretation    Assessment/Plan:  I anticipate this patient will require at least two midnights for appropriate medical management, necessitating inpatient admission.    * Cellulitis  Assessment & Plan  Admit patient to orthopedic floor  Vancomycin and unasyn in ED, can continue abx  Orthopedic surgery consult in AM (states that she is interested in incision and drainage here)   Blood cultures  Lactate acid x 1 negative      Hypertension  Assessment & Plan  Restart home medications    Pneumonia due to COVID-19 virus  Assessment & Plan  Currently not hypoxic, found to be positive at outside facility   If hypoxic, start decadron   COVID precautions     Sepsis (HCC)  Assessment & Plan  This is Sepsis Present on admission  SIRS criteria identified on my evaluation include: Tachycardia, with heart rate greater than 90 BPM and  Leukocytosis, with WBC greater than 12,000  Source is R hand   Sepsis protocol initiated  Fluid resuscitation ordered per protocol  IV antibiotics as appropriate for source of sepsis  While organ dysfunction may be noted elsewhere in this problem list or in the chart, degree of organ dysfunction does not meet CMS criteria for severe sepsis            VTE prophylaxis: heparin ppx

## 2021-12-12 NOTE — OR NURSING
1315: Pt arrives to PACU asleep and calm. VSS. right hand is warm and swollen. Dressing to right middle finger is CDI.     1345: Pt denies pain or nausea. Dressing CDI. Verified with Dr. Vinson that pt is to stay for IV ANBX.    1420: PT continues to deny pain or nausea. Dressing CDI. Report called to Lana RODRIGES.

## 2021-12-12 NOTE — CONSULTS
"Case Summary:  41 y.o. female presenting with a chief complaint of Right middle finger pain and swelling.  States that she about 2 weeks ago with a knife.  States that she cut her finger with a knife about 2 weeks ago.  Was not evaluated or treated.  States that she has been bumping her finger a lot.  Over the last few days, has become more painful and swollen and red.  She was admitted at [facility], but left AMA.  She then presented here to [facility].  Complains of some pain as well as numbness in the right middle finger.      Ortho Exam / Vitals / Weight:  Body mass index is 22.71 kg/m².  Height: 162.6 cm (5'I4\") Weight: 60 kg (132 lb 4.4 oz)  /101   Pulse 87   Temp 36.7 °C (98.1 °F) (Temporal)   Resp 18   Ht 1.626 m (5' 4\")   Wt 60 kg (132 lb 4.4 oz)   SpO2 99%   BMI 22.71 kg/m²       Orthopedic Physical Exam:  GEN: NAD, resting comfortably, nonlabored breathing on RA, pleasant and conversational    UE:  -Right middle finger diffusely swollen and erythematous.  Several wounds along the volar border of the finger, some dried drainage, appears to be purulent.  Tenderness along the volar surface of the finger, but nothing proximal to the MP joint.  Tender along the flexor tendon sheath.  All tendons intact.  - fires AIN/PIN/ulnar nerve appropriately  - compartments soft and compressible  - SILT median/radial/ulnar nerve distribution  - digits warm and well perfused, excellent capillary refill      Lab:    Recent Labs     12/12/21  0242   WBC 14.4*   RBC 4.98   HEMOGLOBIN 13.0   HEMATOCRIT 40.5   MCV 81.3*   MCH 26.1*   MCHC 32.1*   RDW 46.1   PLATELETCT 345   MPV 9.5     Recent Labs     12/12/21  0242   SODIUM 136   POTASSIUM 3.8   CHLORIDE 102   CO2 22   GLUCOSE 82   BUN 16             Past Medical / Past Surgical:  Past Medical History:   Diagnosis Date   • Hypertension    • Hypertension    • Pseudotumor cerebri     History reviewed. No pertinent surgical history.    Home " Medications:  [unfilled]     Allergies:    Iodine    Social / Family Histories:    Social History     Tobacco Use   • Smoking status: Never Smoker   • Smokeless tobacco: Never Used   Substance Use Topics   • Alcohol use: Yes     @[facility]([id number])@  Social History     Social History Narrative   • Not on file    History reviewed. No pertinent family history.    Review of Systems:    Per HPI. The remainder of the review of systems is negative/non-contributory.       Imaging / Other Studies:    Radiographs of the right middle finger negative for foreign body or fracture.      Assessment and Recommendations:  41 y.o. female with right middle finger infection/abscess, early flexor tenosynovitis.  Discussed the situation with her.  She has been noncompliant, left AMA.  We discussed possible natural history and sequelae of this type of injury.  We discussed possible need for amputation if this is untreated.  I offered her irrigation and debridement formally in the operating room.  I do think this would increase the chances of this healing and avoid any serious complications.  We discussed that stiffness is a very common and serious sequelae of this type of injury.  We discussed risk, benefits, alternatives.  I will see her back for right middle finger I&D.

## 2021-12-12 NOTE — OP REPORT
OPERATIVE NOTE     DATE OF PROCEDURE: 12/12/2021            PRE-OP DIAGNOSIS: Right middle finger infection/flexor tenosynovitis            POST-OP DIAGNOSIS: same            PROCEDURE: Right middle finger I&D including flexor tendon sheath            SURGEON: Diego Vinson M.D. - Primary            ANESTHESIA: Local plus sedation            ESTIMATED BLOOD LOSS: Minimal                   SPECIMENS: Culture sent to microbiology lab            COMPLICATIONS: none            CONDITION: stable to PACU            OPERATIVE INDICATIONS AND DESCRIPTION OF PROCEDURE: Saul is a 41-year-old female who presented to the ER with a right middle finger infection.  Clinically most consistent with abscess and early flexor tenosynovitis.  Please see history and physical for complete details of the history.  She elected to undergo formal I&D in the operating room to decrease risk of infection and loss of finger.  Surgical technique, risk, benefits, alternatives were discussed.  We discussed risks including, but not limited to, bleeding, continued infection, need for multiple surgeries, wound healing issues, stiffness, weakness, tendon injury, need for amputation, risks of anesthesia.  Elected to proceed.  I saw her in the preoperative holding area, notified her, and marked the right middle finger.  She was taken back to the operating room.  Light sedation was induced by the anesthesia provider.  A timeout was performed.  I performed a digital block of the middle finger.  The upper extremities prepped and draped in usual orthopedic fashion.  An additional timeout is performed.  A finger tourniquet was applied to the middle finger.  I examined the digit plan my incisions.  I started dorsal.  I made a 2 cm oblique incision over the middle phalanx and bluntly dissected through the subcutaneous tissues.  The extensor tendon was intact.  There is no purulence here but there were some signs of chronic inflammation.  I then went volar  and made an oblique incision over the proximal phalanx.  I bluntly dissected through the subcutaneous tissues.  I encountered purulence.  I took cultures and sent this to the lab.  I sharply debrided the skin and subcutaneous tissues as there was quite a bit of chronically infected looking tissue.  I then opened the flexor tendon sheath at the A3 and a 5 pulley.  I made another mid axial incision over the distal phalanx and bluntly dissected here.  There was also some purulence here.  I connected the incision so that I could decompress all of the abscess.  At this point, I thoroughly irrigated the entire finger including all of the incisions as well as the flexor tendon sheath with normal saline.  Once I felt that I had performed a thorough excisional debridement as well as irrigation, the wounds were loosely closed with 4-0 nylon.  Sterile dressings were applied.  Finger tourniquet was removed.  She awoke from anesthesia and was transferred to the PACU in stable condition.

## 2021-12-12 NOTE — ASSESSMENT & PLAN NOTE
Admit patient to orthopedic floor  Vancomycin and unasyn in ED, can continue abx  Orthopedic surgery following.  Status post incision drainage 12/12  Wound culture growing beta-hemolytic group A strep.  Continue Unasyn  Blood cultures positive at [facility].  Here show no growth

## 2021-12-12 NOTE — ASSESSMENT & PLAN NOTE
This is Sepsis Present on admission  SIRS criteria identified on my evaluation include: Tachycardia, with heart rate greater than 90 BPM and Leukocytosis, with WBC greater than 12,000  Source is R hand, bacteremia  Sepsis protocol initiated  Fluid resuscitation ordered per protocol  IV antibiotics as appropriate for source of sepsis  While organ dysfunction may be noted elsewhere in this problem list or in the chart, degree of organ dysfunction does not meet CMS criteria for severe sepsis

## 2021-12-12 NOTE — ED NOTES
Break RN:  Phlebotomist at bedside drawing second set of blood cultures. Will start antibiotics after they are drawn.

## 2021-12-12 NOTE — PROGRESS NOTES
[facility] Medicine Daily Progress Note    Date of Service  12/12/2021    Chief Complaint  Saul Conde is a 41 y.o. female admitted 12/12/2021 with right middle finger wound infection    Hospital Course  No notes on file    Interval Problem Update  Patient was seen and examined at bedside.  I have personally reviewed and interpreted vitals, labs, and imaging.    12/12.  Afebrile.  Initially tachycardic but this is resolved.  On room air.  Replete potassium.  Denies fevers, chills, chest pains, shortness of breath.  Complains of pain in right middle finger.  Discussed with orthopedic surgery.    I have personally seen and examined the patient at bedside. I discussed the plan of care with patient.  Nurse, social work    Consultants/Specialty  orthopedics    Code Status  Full Code    Disposition  Patient is not medically cleared.   Anticipate discharge to to home with close outpatient follow-up.  I have placed the appropriate orders for post-discharge needs.    Review of Systems  Review of Systems   Constitutional: Negative for chills and fever.   HENT: Negative for congestion and sore throat.    Eyes: Negative for blurred vision.   Respiratory: Negative for cough and shortness of breath.    Cardiovascular: Negative for chest pain, palpitations and leg swelling.   Gastrointestinal: Negative for abdominal pain, constipation, diarrhea, nausea and vomiting.   Genitourinary: Negative for dysuria, frequency and urgency.   Musculoskeletal: Positive for myalgias.   Skin: Negative for rash.   Neurological: Negative for dizziness, weakness and headaches.   Psychiatric/Behavioral: Negative for depression. The patient is not nervous/anxious.    All other systems reviewed and are negative.       Physical Exam  Temp:  [36.5 °C (97.7 °F)] 36.5 °C (97.7 °F)  Pulse:  [] 89  Resp:  [16] 16  BP: (132-163)/() 134/91  SpO2:  [97 %-98 %] 98 %    Physical Exam  Vitals and nursing note reviewed.   Constitutional:        Appearance: Normal appearance. She is ill-appearing.   HENT:      Head: Normocephalic and atraumatic.      Right Ear: External ear normal.      Left Ear: External ear normal.      Nose: Nose normal.      Mouth/Throat:      Mouth: Mucous membranes are moist.      Pharynx: Oropharynx is clear. No oropharyngeal exudate or posterior oropharyngeal erythema.   Eyes:      Extraocular Movements: Extraocular movements intact.      Conjunctiva/sclera: Conjunctivae normal.   Cardiovascular:      Rate and Rhythm: Normal rate and regular rhythm.      Pulses: Normal pulses.      Heart sounds: Normal heart sounds. No murmur heard.      Pulmonary:      Effort: Pulmonary effort is normal. No respiratory distress.      Breath sounds: Normal breath sounds. No stridor. No wheezing or rales.   Abdominal:      General: Abdomen is flat. Bowel sounds are normal. There is no distension.      Palpations: Abdomen is soft. There is no mass.      Tenderness: There is no abdominal tenderness.   Musculoskeletal:      Right hand: Swelling, tenderness and bony tenderness present.      Cervical back: Normal range of motion.   Skin:     Capillary Refill: Capillary refill takes less than 2 seconds.      Findings: Erythema present.      Comments: Right middle finger is swollen, erythematous, decreased range of motion   Neurological:      General: No focal deficit present.      Mental Status: She is alert and oriented to person, place, and time. Mental status is at baseline.      Cranial Nerves: No cranial nerve deficit.   Psychiatric:         Mood and Affect: Mood normal.         Behavior: Behavior normal.         Fluids  No intake or output data in the 24 hours ending 12/12/21 0722    Laboratory  Recent Labs     12/12/21  0242   WBC 14.4*   RBC 4.98   HEMOGLOBIN 13.0   HEMATOCRIT 40.5   MCV 81.3*   MCH 26.1*   MCHC 32.1*   RDW 46.1   PLATELETCT 345   MPV 9.5     Recent Labs     12/12/21  0242   SODIUM 136   POTASSIUM 3.8   CHLORIDE 102   CO2 22    GLUCOSE 82   BUN 16   CREATININE 0.66   CALCIUM 9.5                   Imaging  EC-ECHOCARDIOGRAM COMPLETE W/O CONT    (Results Pending)        Assessment/Plan  * Cellulitis  Assessment & Plan  Admit patient to orthopedic floor  Vancomycin and unasyn in ED, can continue abx  Orthopedic surgery consult in AM (states that she is interested in incision and drainage here)   Blood cultures positive  Lactate acid x 1 negative    Bacteremia  Assessment & Plan  Also had positive cultures at [facility]  Repeat cultures  Echocardiogram     Hypertension  Assessment & Plan  Restart home medications    Pneumonia due to COVID-19 virus  Assessment & Plan  Currently not hypoxic, found to be positive at outside facility   If hypoxic, start decadron   COVID precautions     Patient was Covid negative on admission here.  Denies any respiratory symptoms.  On room air.    Sepsis (HCC)  Assessment & Plan  This is Sepsis Present on admission  SIRS criteria identified on my evaluation include: Tachycardia, with heart rate greater than 90 BPM and Leukocytosis, with WBC greater than 12,000  Source is R hand, bacteremia  Sepsis protocol initiated  Fluid resuscitation ordered per protocol  IV antibiotics as appropriate for source of sepsis  While organ dysfunction may be noted elsewhere in this problem list or in the chart, degree of organ dysfunction does not meet CMS criteria for severe sepsis       VTE prophylaxis: heparin ppx    I have performed a physical exam and reviewed and updated ROS and Plan today (12/12/2021). In review of yesterday's note (12/11/2021), there are no changes except as documented above.

## 2021-12-12 NOTE — ANESTHESIA POSTPROCEDURE EVALUATION
Patient: Saul Conde    Procedure Summary     Date: 12/12/21 Room / Location: [place] [address] / SURGERY [facility]    Anesthesia Start:  Anesthesia Stop:     Procedure: IRRIGATION AND DEBRIDEMENT, WOUND (Right Middle Finger) Diagnosis:     Surgeons: Diego Vinson M.D. Responsible Provider:     Anesthesia Type: MAC ASA Status: 3 - Emergent          Final Anesthesia Type: MAC  Last vitals  BP   142/85   Temp   96.6   Pulse   85   Resp   12   SpO2   100%     Anesthesia Post Evaluation    Patient location during evaluation: PACU  Patient participation: complete - patient participated  Level of consciousness: awake and alert  Pain score: 0    Airway patency: patent  Anesthetic complications: no  Cardiovascular status: hemodynamically stable  Respiratory status: acceptable  Hydration status: euvolemic    PONV: none          No complications documented.

## 2021-12-12 NOTE — ASSESSMENT & PLAN NOTE
Currently not hypoxic, found to be positive at outside facility   If hypoxic, start decadron   COVID precautions     Patient was Covid negative on admission here.  Denies any respiratory symptoms.  On room air.

## 2021-12-12 NOTE — ASSESSMENT & PLAN NOTE
Also had positive cultures at [facility]  Repeat cultures here show no growth  Echocardiogram shows no vegetations  Continue Unasyn for now

## 2021-12-13 LAB
ANION GAP SERPL CALC-SCNC: 11 MMOL/L (ref 7–16)
BASOPHILS # BLD AUTO: 0.3 % (ref 0–1.8)
BASOPHILS # BLD: 0.03 K/UL (ref 0–0.12)
BUN SERPL-MCNC: 11 MG/DL (ref 8–22)
CALCIUM SERPL-MCNC: 8.9 MG/DL (ref 8.5–10.5)
CHLORIDE SERPL-SCNC: 102 MMOL/L (ref 96–112)
CO2 SERPL-SCNC: 22 MMOL/L (ref 20–33)
CREAT SERPL-MCNC: 0.63 MG/DL (ref 0.5–1.4)
EOSINOPHIL # BLD AUTO: 0.3 K/UL (ref 0–0.51)
EOSINOPHIL NFR BLD: 3.3 % (ref 0–6.9)
ERYTHROCYTE [DISTWIDTH] IN BLOOD BY AUTOMATED COUNT: 45.7 FL (ref 35.9–50)
GLUCOSE SERPL-MCNC: 91 MG/DL (ref 65–99)
GRAM STN SPEC: NORMAL
HCT VFR BLD AUTO: 32.5 % (ref 37–47)
HGB BLD-MCNC: 10.4 G/DL (ref 12–16)
IMM GRANULOCYTES # BLD AUTO: 0.03 K/UL (ref 0–0.11)
IMM GRANULOCYTES NFR BLD AUTO: 0.3 % (ref 0–0.9)
LYMPHOCYTES # BLD AUTO: 1.69 K/UL (ref 1–4.8)
LYMPHOCYTES NFR BLD: 18.5 % (ref 22–41)
MAGNESIUM SERPL-MCNC: 1.5 MG/DL (ref 1.5–2.5)
MCH RBC QN AUTO: 25.7 PG (ref 27–33)
MCHC RBC AUTO-ENTMCNC: 32 G/DL (ref 33.6–35)
MCV RBC AUTO: 80.2 FL (ref 81.4–97.8)
MONOCYTES # BLD AUTO: 0.85 K/UL (ref 0–0.85)
MONOCYTES NFR BLD AUTO: 9.3 % (ref 0–13.4)
NEUTROPHILS # BLD AUTO: 6.23 K/UL (ref 2–7.15)
NEUTROPHILS NFR BLD: 68.3 % (ref 44–72)
NRBC # BLD AUTO: 0 K/UL
NRBC BLD-RTO: 0 /100 WBC
PHOSPHATE SERPL-MCNC: 4.6 MG/DL (ref 2.5–4.5)
PLATELET # BLD AUTO: 353 K/UL (ref 164–446)
PMV BLD AUTO: 9.2 FL (ref 9–12.9)
POTASSIUM SERPL-SCNC: 3.8 MMOL/L (ref 3.6–5.5)
PROCALCITONIN SERPL-MCNC: 1.46 NG/ML
RBC # BLD AUTO: 4.05 M/UL (ref 4.2–5.4)
SIGNIFICANT IND 70042: NORMAL
SITE SITE: NORMAL
SODIUM SERPL-SCNC: 135 MMOL/L (ref 135–145)
SOURCE SOURCE: NORMAL
WBC # BLD AUTO: 9.1 K/UL (ref 4.8–10.8)

## 2021-12-13 PROCEDURE — 87040 BLOOD CULTURE FOR BACTERIA: CPT

## 2021-12-13 PROCEDURE — A9270 NON-COVERED ITEM OR SERVICE: HCPCS | Performed by: HOSPITALIST

## 2021-12-13 PROCEDURE — 700111 HCHG RX REV CODE 636 W/ 250 OVERRIDE (IP): Performed by: STUDENT IN AN ORGANIZED HEALTH CARE EDUCATION/TRAINING PROGRAM

## 2021-12-13 PROCEDURE — 770001 HCHG ROOM/CARE - MED/SURG/GYN PRIV*

## 2021-12-13 PROCEDURE — 80048 BASIC METABOLIC PNL TOTAL CA: CPT

## 2021-12-13 PROCEDURE — 700102 HCHG RX REV CODE 250 W/ 637 OVERRIDE(OP): Performed by: INTERNAL MEDICINE

## 2021-12-13 PROCEDURE — 700105 HCHG RX REV CODE 258: Performed by: INTERNAL MEDICINE

## 2021-12-13 PROCEDURE — 700111 HCHG RX REV CODE 636 W/ 250 OVERRIDE (IP): Performed by: INTERNAL MEDICINE

## 2021-12-13 PROCEDURE — 700102 HCHG RX REV CODE 250 W/ 637 OVERRIDE(OP): Performed by: STUDENT IN AN ORGANIZED HEALTH CARE EDUCATION/TRAINING PROGRAM

## 2021-12-13 PROCEDURE — A9270 NON-COVERED ITEM OR SERVICE: HCPCS | Performed by: STUDENT IN AN ORGANIZED HEALTH CARE EDUCATION/TRAINING PROGRAM

## 2021-12-13 PROCEDURE — 83735 ASSAY OF MAGNESIUM: CPT

## 2021-12-13 PROCEDURE — 85025 COMPLETE CBC W/AUTO DIFF WBC: CPT

## 2021-12-13 PROCEDURE — 84100 ASSAY OF PHOSPHORUS: CPT

## 2021-12-13 PROCEDURE — 700102 HCHG RX REV CODE 250 W/ 637 OVERRIDE(OP): Performed by: HOSPITALIST

## 2021-12-13 PROCEDURE — 84145 PROCALCITONIN (PCT): CPT

## 2021-12-13 PROCEDURE — 99233 SBSQ HOSP IP/OBS HIGH 50: CPT | Performed by: INTERNAL MEDICINE

## 2021-12-13 PROCEDURE — A9270 NON-COVERED ITEM OR SERVICE: HCPCS | Performed by: INTERNAL MEDICINE

## 2021-12-13 RX ORDER — CHLORHEXIDINE GLUCONATE ORAL RINSE 1.2 MG/ML
30 SOLUTION DENTAL 3 TIMES DAILY
Status: DISCONTINUED | OUTPATIENT
Start: 2021-12-13 | End: 2021-12-14 | Stop reason: HOSPADM

## 2021-12-13 RX ORDER — HYDROCODONE BITARTRATE AND ACETAMINOPHEN 5; 325 MG/1; MG/1
2 TABLET ORAL
Status: DISCONTINUED | OUTPATIENT
Start: 2021-12-13 | End: 2021-12-14 | Stop reason: HOSPADM

## 2021-12-13 RX ORDER — HYDROCODONE BITARTRATE AND ACETAMINOPHEN 5; 325 MG/1; MG/1
1 TABLET ORAL
Status: DISCONTINUED | OUTPATIENT
Start: 2021-12-13 | End: 2021-12-14 | Stop reason: HOSPADM

## 2021-12-13 RX ORDER — ONDANSETRON 2 MG/ML
4 INJECTION INTRAMUSCULAR; INTRAVENOUS EVERY 4 HOURS PRN
Status: DISCONTINUED | OUTPATIENT
Start: 2021-12-13 | End: 2021-12-14 | Stop reason: HOSPADM

## 2021-12-13 RX ADMIN — ACETAZOLAMIDE 500 MG: 250 TABLET ORAL at 06:14

## 2021-12-13 RX ADMIN — AMPICILLIN SODIUM AND SULBACTAM SODIUM 3 G: 2; 1 INJECTION, POWDER, FOR SOLUTION INTRAMUSCULAR; INTRAVENOUS at 14:17

## 2021-12-13 RX ADMIN — HEPARIN SODIUM 5000 UNITS: 5000 INJECTION, SOLUTION INTRAVENOUS; SUBCUTANEOUS at 14:23

## 2021-12-13 RX ADMIN — ONDANSETRON 4 MG: 2 INJECTION INTRAMUSCULAR; INTRAVENOUS at 11:44

## 2021-12-13 RX ADMIN — OXYCODONE HYDROCHLORIDE 15 MG: 10 TABLET ORAL at 03:41

## 2021-12-13 RX ADMIN — AMPICILLIN SODIUM AND SULBACTAM SODIUM 3 G: 2; 1 INJECTION, POWDER, FOR SOLUTION INTRAMUSCULAR; INTRAVENOUS at 22:37

## 2021-12-13 RX ADMIN — ACETAMINOPHEN 650 MG: 325 TABLET, FILM COATED ORAL at 03:54

## 2021-12-13 RX ADMIN — CHLORHEXIDINE GLUCONATE 0.12% ORAL RINSE 30 ML: 1.2 LIQUID ORAL at 14:17

## 2021-12-13 RX ADMIN — LISINOPRIL 20 MG: 20 TABLET ORAL at 06:12

## 2021-12-13 RX ADMIN — HEPARIN SODIUM 5000 UNITS: 5000 INJECTION, SOLUTION INTRAVENOUS; SUBCUTANEOUS at 22:38

## 2021-12-13 RX ADMIN — HYDROCHLOROTHIAZIDE 25 MG: 25 TABLET ORAL at 06:12

## 2021-12-13 RX ADMIN — OXYCODONE HYDROCHLORIDE 10 MG: 10 TABLET ORAL at 09:30

## 2021-12-13 RX ADMIN — AMPICILLIN SODIUM AND SULBACTAM SODIUM 3 G: 2; 1 INJECTION, POWDER, FOR SOLUTION INTRAMUSCULAR; INTRAVENOUS at 06:15

## 2021-12-13 RX ADMIN — CHLORHEXIDINE GLUCONATE 0.12% ORAL RINSE 30 ML: 1.2 LIQUID ORAL at 22:29

## 2021-12-13 RX ADMIN — HYDROCODONE BITARTRATE AND ACETAMINOPHEN 2 TABLET: 5; 325 TABLET ORAL at 15:39

## 2021-12-13 RX ADMIN — CHLORHEXIDINE GLUCONATE 0.12% ORAL RINSE 15 ML: 1.2 LIQUID ORAL at 09:15

## 2021-12-13 RX ADMIN — HEPARIN SODIUM 5000 UNITS: 5000 INJECTION, SOLUTION INTRAVENOUS; SUBCUTANEOUS at 06:12

## 2021-12-13 ASSESSMENT — ENCOUNTER SYMPTOMS
SORE THROAT: 0
ABDOMINAL PAIN: 0
NERVOUS/ANXIOUS: 0
WEAKNESS: 0
SHORTNESS OF BREATH: 0
PALPITATIONS: 0
DEPRESSION: 0
VOMITING: 0
FEVER: 0
CHILLS: 0
CONSTIPATION: 0
HEADACHES: 0
BLURRED VISION: 0
COUGH: 0
NAUSEA: 1
DIARRHEA: 0
MYALGIAS: 1
DIZZINESS: 0

## 2021-12-13 ASSESSMENT — PAIN SCALES - PAIN ASSESSMENT IN ADVANCED DEMENTIA (PAINAD)
FACIALEXPRESSION: SMILING OR INEXPRESSIVE
BODYLANGUAGE: RELAXED
CONSOLABILITY: NO NEED TO CONSOLE
BREATHING: NORMAL
TOTALSCORE: 0

## 2021-12-13 ASSESSMENT — PAIN DESCRIPTION - PAIN TYPE
TYPE: SURGICAL PAIN;ACUTE PAIN
TYPE: ACUTE PAIN;SURGICAL PAIN
TYPE: SURGICAL PAIN;ACUTE PAIN

## 2021-12-13 NOTE — CARE PLAN
Problem: Knowledge Deficit - Standard  Goal: Patient and family/care givers will demonstrate understanding of plan of care, disease process/condition, diagnostic tests and medications  Outcome: Progressing   Discussed POC with patient.    Problem: Pain - Standard  Goal: Alleviation of pain or a reduction in pain to the patient’s comfort goal  Outcome: Progressing  Administered medication per MAR.     The patient is Stable - Low risk of patient condition declining or worsening    Shift Goals  Clinical Goals: Pain control  Patient Goals: Pain control, rest    Progress made toward(s) clinical / shift goals: Patient able to verbalize understanding of POC, all questions answered at this time, Patient able to rest through the night.

## 2021-12-13 NOTE — CARE PLAN
The patient is Watcher - Medium risk of patient condition declining or worsening    Shift Goals  Clinical Goals: Pain control  Patient Goals: Pain control    Progress made toward(s) clinical / shift goals:    Problem: Knowledge Deficit - Standard  Goal: Patient and family/care givers will demonstrate understanding of plan of care, disease process/condition, diagnostic tests and medications  Outcome: Progressing  Note: Patient able to communicate needs effectively. Plan of care updated to patient and on whiteboard. All questions answered at this time.      Problem: Respiratory  Goal: Patient will achieve/maintain optimum respiratory ventilation and gas exchange  Outcome: Progressing  Flowsheets  Taken 12/12/2021 1640 by Radha Ku  O2 Delivery Device: None - Room Air  Taken 12/12/2021 1500 by Lana Locke R.NLisa  Deep Breathe and Cough: Performs Correctly     Problem: Pain - Standard  Goal: Alleviation of pain or a reduction in pain to the patient’s comfort goal  Outcome: Progressing  Note: Patient required Oxy 10mg and two doses of Dilaudid for pain control (see MAR). Patient resting comfortably now.       Patient is not progressing towards the following goals:

## 2021-12-13 NOTE — INFECTION CONTROL
This patient is considered COVID RECOVERED.    Patient initially tested positive for COVID on 12/10/2021 at [facility].Test at [facility] PCR negative  Patient is greater than or equal to 15 days from symptom onset and/or positive test, with symptom improvement.  Per the CDC guidance, this patient no longer requires transmission based precautions.  Patient may be placed on any unit per the bed assignment policy, including placement in a semi-private room with a roommate.

## 2021-12-13 NOTE — PROGRESS NOTES
Patient has been restless/crying in pain since arriving from PACU. PRN pain medications given (see MAR). Paged Dr. Zimmerman about patient's high BP's, MD will order medications as they deem necessary.    1830 Updated Dr. Zimmerman about patient's uncontrolled pain, PRN pain medication readjusted    Reassessed patient's right middle finger, fingertip is dusky blue and difficult to assess cap refill due to patient refusing full assessment because of pain. Patient's fingers/hand is red and hot otherwise. ALFREDITO Kee notified.  Assessed patient's finger in case tourniquet was still on. Tourniquet was not on.

## 2021-12-13 NOTE — PROGRESS NOTES
[facility] Medicine Daily Progress Note    Date of Service  12/13/2021    Chief Complaint  Saul Conde is a 41 y.o. female admitted 12/12/2021 with right middle finger wound infection    Hospital Course  No notes on file    Interval Problem Update  Patient was seen and examined at bedside.  I have personally reviewed and interpreted vitals, labs, and imaging.    12/12.  Afebrile.  Initially tachycardic but this is resolved.  On room air.  Replete potassium.  Denies fevers, chills, chest pains, shortness of breath.  Complains of pain in right middle finger.  Discussed with orthopedic surgery.  12/13.  Afebrile.  Episodes of tachycardia.  Blood pressure better controlled.  On room air.  Tolerated incision and drainage yesterday.  Denies fevers, chills, chest pains, shortness of breath.  Reports [place] 15 makes her too groggy.  Also reports some nausea.  Added Zofran and adjusted pain scale.    I have personally seen and examined the patient at bedside. I discussed the plan of care with patient.  Nurse, social work    Consultants/Specialty  orthopedics    Code Status  Full Code    Disposition  Patient is not medically cleared.   Anticipate discharge to to home with close outpatient follow-up.  I have placed the appropriate orders for post-discharge needs.    Review of Systems  Review of Systems   Constitutional: Negative for chills and fever.   HENT: Negative for congestion and sore throat.    Eyes: Negative for blurred vision.   Respiratory: Negative for cough and shortness of breath.    Cardiovascular: Negative for chest pain, palpitations and leg swelling.   Gastrointestinal: Positive for nausea. Negative for abdominal pain, constipation, diarrhea and vomiting.   Genitourinary: Negative for dysuria, frequency and urgency.   Musculoskeletal: Positive for myalgias.   Skin: Negative for rash.   Neurological: Negative for dizziness, weakness and headaches.   Psychiatric/Behavioral: Negative for depression. The  patient is not nervous/anxious.    All other systems reviewed and are negative.       Physical Exam  Temp:  [36.3 °C (97.3 °F)-37.4 °C (99.4 °F)] 36.3 °C (97.3 °F)  Pulse:  [] 90  Resp:  [10-19] 16  BP: (117-183)/() 142/91  SpO2:  [91 %-100 %] 93 %    Physical Exam  Vitals and nursing note reviewed.   Constitutional:       Appearance: Normal appearance. She is ill-appearing.   HENT:      Head: Normocephalic and atraumatic.      Right Ear: External ear normal.      Left Ear: External ear normal.      Nose: Nose normal.      Mouth/Throat:      Mouth: Mucous membranes are moist.      Pharynx: Oropharynx is clear. No oropharyngeal exudate or posterior oropharyngeal erythema.   Eyes:      Extraocular Movements: Extraocular movements intact.      Conjunctiva/sclera: Conjunctivae normal.   Cardiovascular:      Rate and Rhythm: Normal rate and regular rhythm.      Pulses: Normal pulses.      Heart sounds: Normal heart sounds. No murmur heard.      Pulmonary:      Effort: Pulmonary effort is normal. No respiratory distress.      Breath sounds: Normal breath sounds. No stridor. No wheezing or rales.   Abdominal:      General: Abdomen is flat. Bowel sounds are normal. There is no distension.      Palpations: Abdomen is soft. There is no mass.      Tenderness: There is no abdominal tenderness.   Musculoskeletal:      Right hand: Swelling, tenderness and bony tenderness present.      Cervical back: Normal range of motion.   Skin:     Capillary Refill: Capillary refill takes less than 2 seconds.      Findings: Erythema present.      Comments: Right middle finger is swollen, erythematous, decreased range of motion   Neurological:      General: No focal deficit present.      Mental Status: She is alert and oriented to person, place, and time. Mental status is at baseline.      Cranial Nerves: No cranial nerve deficit.   Psychiatric:         Mood and Affect: Mood normal.         Behavior: Behavior normal.          Fluids    Intake/Output Summary (Last 24 hours) at 12/13/2021 0847  Last data filed at 12/12/2021 1312  Gross per 24 hour   Intake 600 ml   Output --   Net 600 ml       Laboratory  Recent Labs     12/12/21  0242 12/13/21  0510   WBC 14.4* 9.1   RBC 4.98 4.05*   HEMOGLOBIN 13.0 10.4*   HEMATOCRIT 40.5 32.5*   MCV 81.3* 80.2*   MCH 26.1* 25.7*   MCHC 32.1* 32.0*   RDW 46.1 45.7   PLATELETCT 345 353   MPV 9.5 9.2     Recent Labs     12/12/21  0242 12/13/21  0510   SODIUM 136 135   POTASSIUM 3.8 3.8   CHLORIDE 102 102   CO2 22 22   GLUCOSE 82 91   BUN 16 11   CREATININE 0.66 0.63   CALCIUM 9.5 8.9                   Imaging  EC-ECHOCARDIOGRAM COMPLETE W/O CONT   Final Result           Assessment/Plan  * Cellulitis  Assessment & Plan  Admit patient to orthopedic floor  Vancomycin and unasyn in ED, can continue abx  Orthopedic surgery following.  Status post incision drainage 12/12  Wound culture growing beta-hemolytic group A strep.  Continue Unasyn  Blood cultures positive at [facility].  Here show no growth    Bacteremia  Assessment & Plan  Also had positive cultures at [facility]  Repeat cultures here show no growth  Echocardiogram shows no vegetations  Continue Unasyn for now    Hypertension  Assessment & Plan  Restart home medications lisinopril, hydrochlorothiazide    Pneumonia due to COVID-19 virus  Assessment & Plan  Currently not hypoxic, found to be positive at outside facility   If hypoxic, start decadron   COVID precautions     Patient was Covid negative on admission here.  Denies any respiratory symptoms.  On room air.    Sepsis (HCC)  Assessment & Plan  This is Sepsis Present on admission  SIRS criteria identified on my evaluation include: Tachycardia, with heart rate greater than 90 BPM and Leukocytosis, with WBC greater than 12,000  Source is R hand, bacteremia  Sepsis protocol initiated  Fluid resuscitation ordered per protocol  IV antibiotics as appropriate for source of sepsis  While  organ dysfunction may be noted elsewhere in this problem list or in the chart, degree of organ dysfunction does not meet CMS criteria for severe sepsis       VTE prophylaxis: heparin ppx    I have performed a physical exam and reviewed and updated ROS and Plan today (12/13/2021). In review of yesterday's note (12/12/2021), there are no changes except as documented above.

## 2021-12-13 NOTE — PROGRESS NOTES
Bedside report received.  Assessment complete.  A&O x 4. Patient calls appropriately.  Patient ambulates with without assist.    Patient has 8/10 pain. Pain managed with prescribed medications.  Denies N&V. Tolerating diet.  Surgical incision to finger is dressed, CDI. See LDA..  + void  Patient denies SOB.  Patient pleasant with staff and resting in bed.  Review plan with of care with patient. Call light and personal belongings within reach. Hourly rounding in place. All needs met at this time.

## 2021-12-14 ENCOUNTER — PHARMACY VISIT (OUTPATIENT)
Dept: PHARMACY | Facility: MEDICAL CENTER | Age: 41
End: 2021-12-14
Payer: COMMERCIAL

## 2021-12-14 VITALS
RESPIRATION RATE: 15 BRPM | HEART RATE: 95 BPM | WEIGHT: 129.41 LBS | DIASTOLIC BLOOD PRESSURE: 98 MMHG | BODY MASS INDEX: 22.09 KG/M2 | HEIGHT: 64 IN | SYSTOLIC BLOOD PRESSURE: 149 MMHG | OXYGEN SATURATION: 95 % | TEMPERATURE: 98 F

## 2021-12-14 LAB
ANION GAP SERPL CALC-SCNC: 12 MMOL/L (ref 7–16)
BACTERIA WND AEROBE CULT: ABNORMAL
BACTERIA WND AEROBE CULT: ABNORMAL
BASOPHILS # BLD AUTO: 0.3 % (ref 0–1.8)
BASOPHILS # BLD: 0.02 K/UL (ref 0–0.12)
BUN SERPL-MCNC: 9 MG/DL (ref 8–22)
CALCIUM SERPL-MCNC: 8.9 MG/DL (ref 8.5–10.5)
CHLORIDE SERPL-SCNC: 103 MMOL/L (ref 96–112)
CO2 SERPL-SCNC: 21 MMOL/L (ref 20–33)
CREAT SERPL-MCNC: 0.68 MG/DL (ref 0.5–1.4)
EOSINOPHIL # BLD AUTO: 0.25 K/UL (ref 0–0.51)
EOSINOPHIL NFR BLD: 3.7 % (ref 0–6.9)
ERYTHROCYTE [DISTWIDTH] IN BLOOD BY AUTOMATED COUNT: 44.5 FL (ref 35.9–50)
GLUCOSE SERPL-MCNC: 89 MG/DL (ref 65–99)
GRAM STN SPEC: ABNORMAL
HCT VFR BLD AUTO: 34.5 % (ref 37–47)
HGB BLD-MCNC: 11.2 G/DL (ref 12–16)
IMM GRANULOCYTES # BLD AUTO: 0.04 K/UL (ref 0–0.11)
IMM GRANULOCYTES NFR BLD AUTO: 0.6 % (ref 0–0.9)
LYMPHOCYTES # BLD AUTO: 1.86 K/UL (ref 1–4.8)
LYMPHOCYTES NFR BLD: 27.8 % (ref 22–41)
MAGNESIUM SERPL-MCNC: 1.7 MG/DL (ref 1.5–2.5)
MCH RBC QN AUTO: 25.5 PG (ref 27–33)
MCHC RBC AUTO-ENTMCNC: 32.5 G/DL (ref 33.6–35)
MCV RBC AUTO: 78.6 FL (ref 81.4–97.8)
MONOCYTES # BLD AUTO: 0.57 K/UL (ref 0–0.85)
MONOCYTES NFR BLD AUTO: 8.5 % (ref 0–13.4)
NEUTROPHILS # BLD AUTO: 3.96 K/UL (ref 2–7.15)
NEUTROPHILS NFR BLD: 59.1 % (ref 44–72)
NRBC # BLD AUTO: 0 K/UL
NRBC BLD-RTO: 0 /100 WBC
PHOSPHATE SERPL-MCNC: 4.9 MG/DL (ref 2.5–4.5)
PLATELET # BLD AUTO: 359 K/UL (ref 164–446)
PMV BLD AUTO: 9.8 FL (ref 9–12.9)
POTASSIUM SERPL-SCNC: 4.3 MMOL/L (ref 3.6–5.5)
RBC # BLD AUTO: 4.39 M/UL (ref 4.2–5.4)
SIGNIFICANT IND 70042: ABNORMAL
SITE SITE: ABNORMAL
SODIUM SERPL-SCNC: 136 MMOL/L (ref 135–145)
SOURCE SOURCE: ABNORMAL
WBC # BLD AUTO: 6.7 K/UL (ref 4.8–10.8)

## 2021-12-14 PROCEDURE — 85025 COMPLETE CBC W/AUTO DIFF WBC: CPT

## 2021-12-14 PROCEDURE — A9270 NON-COVERED ITEM OR SERVICE: HCPCS | Performed by: HOSPITALIST

## 2021-12-14 PROCEDURE — 84100 ASSAY OF PHOSPHORUS: CPT

## 2021-12-14 PROCEDURE — 700111 HCHG RX REV CODE 636 W/ 250 OVERRIDE (IP): Performed by: STUDENT IN AN ORGANIZED HEALTH CARE EDUCATION/TRAINING PROGRAM

## 2021-12-14 PROCEDURE — 700105 HCHG RX REV CODE 258: Performed by: INTERNAL MEDICINE

## 2021-12-14 PROCEDURE — 99239 HOSP IP/OBS DSCHRG MGMT >30: CPT | Performed by: STUDENT IN AN ORGANIZED HEALTH CARE EDUCATION/TRAINING PROGRAM

## 2021-12-14 PROCEDURE — 80048 BASIC METABOLIC PNL TOTAL CA: CPT

## 2021-12-14 PROCEDURE — A9270 NON-COVERED ITEM OR SERVICE: HCPCS | Performed by: INTERNAL MEDICINE

## 2021-12-14 PROCEDURE — 700102 HCHG RX REV CODE 250 W/ 637 OVERRIDE(OP): Performed by: HOSPITALIST

## 2021-12-14 PROCEDURE — 700102 HCHG RX REV CODE 250 W/ 637 OVERRIDE(OP): Performed by: INTERNAL MEDICINE

## 2021-12-14 PROCEDURE — 700111 HCHG RX REV CODE 636 W/ 250 OVERRIDE (IP): Performed by: INTERNAL MEDICINE

## 2021-12-14 PROCEDURE — 700102 HCHG RX REV CODE 250 W/ 637 OVERRIDE(OP): Performed by: STUDENT IN AN ORGANIZED HEALTH CARE EDUCATION/TRAINING PROGRAM

## 2021-12-14 PROCEDURE — 83735 ASSAY OF MAGNESIUM: CPT

## 2021-12-14 PROCEDURE — A9270 NON-COVERED ITEM OR SERVICE: HCPCS | Performed by: STUDENT IN AN ORGANIZED HEALTH CARE EDUCATION/TRAINING PROGRAM

## 2021-12-14 PROCEDURE — RXMED WILLOW AMBULATORY MEDICATION CHARGE: Performed by: STUDENT IN AN ORGANIZED HEALTH CARE EDUCATION/TRAINING PROGRAM

## 2021-12-14 RX ORDER — HYDROCODONE BITARTRATE AND ACETAMINOPHEN 5; 325 MG/1; MG/1
1 TABLET ORAL EVERY 8 HOURS PRN
Qty: 15 TABLET | Refills: 0 | Status: SHIPPED | OUTPATIENT
Start: 2021-12-14 | End: 2021-12-19

## 2021-12-14 RX ORDER — AMOXICILLIN AND CLAVULANATE POTASSIUM 875; 125 MG/1; MG/1
1 TABLET, FILM COATED ORAL 2 TIMES DAILY
Qty: 14 TABLET | Refills: 0 | Status: SHIPPED | OUTPATIENT
Start: 2021-12-14 | End: 2021-12-21

## 2021-12-14 RX ADMIN — LISINOPRIL 20 MG: 20 TABLET ORAL at 06:08

## 2021-12-14 RX ADMIN — HEPARIN SODIUM 5000 UNITS: 5000 INJECTION, SOLUTION INTRAVENOUS; SUBCUTANEOUS at 06:08

## 2021-12-14 RX ADMIN — HYDROCHLOROTHIAZIDE 25 MG: 25 TABLET ORAL at 06:08

## 2021-12-14 RX ADMIN — ACETAZOLAMIDE 500 MG: 250 TABLET ORAL at 06:09

## 2021-12-14 RX ADMIN — AMPICILLIN SODIUM AND SULBACTAM SODIUM 3 G: 2; 1 INJECTION, POWDER, FOR SOLUTION INTRAMUSCULAR; INTRAVENOUS at 06:08

## 2021-12-14 RX ADMIN — CHLORHEXIDINE GLUCONATE 0.12% ORAL RINSE 30 ML: 1.2 LIQUID ORAL at 08:11

## 2021-12-14 ASSESSMENT — PAIN DESCRIPTION - PAIN TYPE: TYPE: SURGICAL PAIN;ACUTE PAIN

## 2021-12-14 NOTE — DISCHARGE PLANNING
Meds-to-Beds: Discharge prescription orders listed below delivered to patient in discharge lounge. RN notified. Patient counseled.      Current Outpatient Medications   Medication Sig Dispense Refill   • HYDROcodone-acetaminophen (NORCO) 5-325 MG Tab per tablet Take 1 Tablet by mouth every 8 hours as needed for up to 5 days. 15 Tablet 0   • amoxicillin-clavulanate (AUGMENTIN) 875-125 MG Tab Take 1 Tablet by mouth 2 times a day for 7 days. 14 Tablet 0      Tessa Hurd, PharmD

## 2021-12-14 NOTE — DISCHARGE PLANNING
Anticipated Discharge Disposition: Discharge to RV.    Action: Patient to discharge on oral abx and has been educated on how to complete own dressing changes. Patient provided with taxi voucher to [facility] [place] [id number].    Barriers to Discharge: None identified.    Plan: Discharge to RV.

## 2021-12-14 NOTE — PROGRESS NOTES
Discharge orders acknowledged, Pt aware and compliant with new orders, D/C teaching completed, Pt able to verbalize needs and complaint with discharge orders. IV removed.taxi voucher received and called for pt ride. Meds to bed delivered to pt prior to discharge.

## 2021-12-14 NOTE — DISCHARGE SUMMARY
Discharge Summary    CHIEF COMPLAINT ON ADMISSION  Chief Complaint   Patient presents with   • Digit Pain     Patient has infected Right middle finger, pt reports she was septic. Patient has been in [facility] recieving IV abx. Patient reports she was suppose to have surgery on her R middle finger for it to be possibly drained. Patient reports that the doctor has ripped the skin off her finger. Patient was at [facility] yesterday.        Reason for Admission  Right 3rd digit cellulitis     Admission Date  12/12/2021    CODE STATUS  Full Code    HPI & HOSPITAL COURSE  Saul Conde is a 41 y.o. female with HTN and Covid recovered who presented 12/12/2021 with fever, pain and swelling in her right middle finger for several days. Reportedly, she has had a wound there for about 2 weeks; however, she bumped it a week ago, and since then has become more swollen and tender.  For the last few days, she developed fever.  She decided to present to [facility], she was given Rocephin, and reported symptomatic relief after being administered antibiotics.  Blood cultures grew gram-positive cocci in chains.  She was referred to orthopedic surgeon for incision and drainage; however, refused as she felt uncomfortable with the service there and left AMA.     In the ED, patient found to be tachycardic.  Remarkable labs include neutrophilic leukocytosis, alkaline phosphatase 222, lactic acid negative.  Patient was given vancomycin and Unasyn in ED. Patient was admitted to medicine for sepsis secondary to cellulitis. Orthopedic surgery consulted on admission - Pt underwent right middle finger I&D including flexor tendon sheath on 12/12. Pt tolerated the procedure well and pain was monitored post op. Wound culture on admission grew Group B strep. Repeated blood was with no growth. Pain was adequately controlled.     12/14: stable vitals and afebrile. OK to DC from ortho standpoint. At this point, DC plan  discussed with oral Abx. Would care instructions provided.       Therefore, she is discharged in fair and stable condition to home with close outpatient follow-up.    The patient met 2-midnight criteria for an inpatient stay at the time of discharge.    Discharge Date  12/14/2021    FOLLOW UP ITEMS POST DISCHARGE  12/14/21    DISCHARGE DIAGNOSES  Principal Problem:    Cellulitis POA: Yes  Active Problems:    Sepsis (HCC) POA: Yes    Pneumonia due to COVID-19 virus POA: No    Hypertension POA: Yes    Bacteremia POA: Yes  Resolved Problems:    * No resolved hospital problems. *      FOLLOW UP  Please follow up with your primary care physician within 1 to 2 weeks of discharge. [facility] if available.    MEDICATIONS ON DISCHARGE     Medication List      START taking these medications      Instructions   amoxicillin-clavulanate 875-125 MG Tabs  Commonly known as: AUGMENTIN   Take 1 Tablet by mouth 2 times a day for 7 days.  Dose: 1 Tablet     HYDROcodone-acetaminophen 5-325 MG Tabs per tablet  Commonly known as: NORCO   Take 1 Tablet by mouth every 8 hours as needed for up to 5 days.  Dose: 1 Tablet        CHANGE how you take these medications      Instructions   acetaZOLAMIDE 250 MG Tabs  What changed:   · how much to take  · when to take this  Commonly known as: DIAMOX   Take 1 Tab by mouth 2 times a day.  Dose: 250 mg        CONTINUE taking these medications      Instructions   hydroCHLOROthiazide 25 MG Tabs  Commonly known as: HYDRODIURIL   Take 1 Tab by mouth every day.  Dose: 25 mg     lisinopril 20 MG Tabs  Commonly known as: PRINIVIL   Take 1 Tab by mouth every day.  Dose: 20 mg            Allergies  Allergies   Allergen Reactions   • Iodine Anaphylaxis     Contrast with Iodine       DIET  Orders Placed This Encounter   Procedures   • Diet Order Diet: Regular     Standing Status:   Standing     Number of Occurrences:   1     Order Specific Question:   Diet:     Answer:   Regular [1]   • Discontinue Diet Tray      Standing Status:   Standing     Number of Occurrences:   1       ACTIVITY  As tolerated.  Weight bearing as tolerated    CONSULTATIONS  Orthopedics    PROCEDURES  Right middle finger I&D including flexor tendon sheath    LABORATORY  Lab Results   Component Value Date    SODIUM 136 12/14/2021    POTASSIUM 4.3 12/14/2021    CHLORIDE 103 12/14/2021    CO2 21 12/14/2021    GLUCOSE 89 12/14/2021    BUN 9 12/14/2021    CREATININE 0.68 12/14/2021        Lab Results   Component Value Date    WBC 6.7 12/14/2021    HEMOGLOBIN 11.2 (L) 12/14/2021    HEMATOCRIT 34.5 (L) 12/14/2021    PLATELETCT 359 12/14/2021        Total time of the discharge process exceeds 36 minutes.

## 2021-12-14 NOTE — CARE PLAN
The patient is Watcher - Medium risk of patient condition declining or worsening    Shift Goals  Clinical Goals: Pain management, Ambulation, Wound Mangement   Patient Goals: Pain management, Rest    Progress made toward(s) clinical / shift goals: Pain management, Ambulation, Wound Mangement       Problem: Knowledge Deficit - Standard  Goal: Patient and family/care givers will demonstrate understanding of plan of care, disease process/condition, diagnostic tests and medications  Outcome: Progressing     Problem: Hemodynamics  Goal: Patient's hemodynamics, fluid balance and neurologic status will be stable or improve  Outcome: Progressing     Problem: Respiratory  Goal: Patient will achieve/maintain optimum respiratory ventilation and gas exchange  Outcome: Progressing     Problem: Pain - Standard  Goal: Alleviation of pain or a reduction in pain to the patient’s comfort goal  Outcome: Progressing

## 2021-12-14 NOTE — CARE PLAN
Problem: Knowledge Deficit - Standard  Goal: Patient and family/care givers will demonstrate understanding of plan of care, disease process/condition, diagnostic tests and medications  Outcome: Progressing   Discussed POC with patient.    Problem: Pain - Standard  Goal: Alleviation of pain or a reduction in pain to the patient’s comfort goal  Outcome: Progressing   Patient educated on 0-10 pain scale, and non-pharmacological pain control. Encouraged to verbalize and contact staff when in pain.  Administered PRN medication as needed.     The patient is Stable - Low risk of patient condition declining or worsening    Shift Goals  Clinical Goals: Pain management, safety  Patient Goals: Pain management, rest     Progress made toward(s) clinical / shift goals:  patient able to verbalize understanding of POC, patient able to rest through the night with current medication regimen.

## 2021-12-14 NOTE — DISCHARGE INSTRUCTIONS
Cellulitis, Adult    Cellulitis is a skin infection. The infected area is usually warm, red, swollen, and tender. This condition occurs most often in the arms and lower legs. The infection can travel to the muscles, blood, and underlying tissue and become serious. It is very important to get treated for this condition.  What are the causes?  Cellulitis is caused by bacteria. The bacteria enter through a break in the skin, such as a cut, burn, insect bite, open sore, or crack.  What increases the risk?  This condition is more likely to occur in people who:  · Have a weak body defense system (immune system).  · Have open wounds on the skin, such as cuts, burns, bites, and scrapes. Bacteria can enter the body through these open wounds.  · Are older than 60 years of age.  · Have diabetes.  · Have a type of long-lasting (chronic) liver disease (cirrhosis) or kidney disease.  · Are obese.  · Have a skin condition such as:  ? Itchy rash (eczema).  ? Slow movement of blood in the veins (venous stasis).  ? Fluid buildup below the skin (edema).  · Have had radiation therapy.  · Use IV drugs.  What are the signs or symptoms?  Symptoms of this condition include:  · Redness, streaking, or spotting on the skin.  · Swollen area of the skin.  · Tenderness or pain when an area of the skin is touched.  · Warm skin.  · A fever.  · Chills.  · Blisters.  How is this diagnosed?  This condition is diagnosed based on a medical history and physical exam. You may also have tests, including:  · Blood tests.  · Imaging tests.  How is this treated?  Treatment for this condition may include:  · Medicines, such as antibiotic medicines or medicines to treat allergies (antihistamines).  · Supportive care, such as rest and application of cold or warm cloths (compresses) to the skin.  · Hospital care, if the condition is severe.  The infection usually starts to get better within 1-2 days of treatment.  Follow these instructions at  home:    Medicines  · Take over-the-counter and prescription medicines only as told by your health care provider.  · If you were prescribed an antibiotic medicine, take it as told by your health care provider. Do not stop taking the antibiotic even if you start to feel better.  General instructions  · Drink enough fluid to keep your urine pale yellow.  · Do not touch or rub the infected area.  · Raise (elevate) the infected area above the level of your heart while you are sitting or lying down.  · Apply warm or cold compresses to the affected area as told by your health care provider.  · Keep all follow-up visits as told by your health care provider. This is important. These visits let your health care provider make sure a more serious infection is not developing.  Contact a health care provider if:  · You have a fever.  · Your symptoms do not begin to improve within 1-2 days of starting treatment.  · Your bone or joint underneath the infected area becomes painful after the skin has healed.  · Your infection returns in the same area or another area.  · You notice a swollen bump in the infected area.  · You develop new symptoms.  · You have a general ill feeling (malaise) with muscle aches and pains.  Get help right away if:  · Your symptoms get worse.  · You feel very sleepy.  · You develop vomiting or diarrhea that persists.  · You notice red streaks coming from the infected area.  · Your red area gets larger or turns dark in color.  These symptoms may represent a serious problem that is an emergency. Do not wait to see if the symptoms will go away. Get medical help right away. Call your local emergency services (911 in the [place] Do not drive yourself to the hospital.  Summary  · Cellulitis is a skin infection. This condition occurs most often in the arms and lower legs.  · Treatment for this condition may include medicines, such as antibiotic medicines or antihistamines.  · Take over-the-counter and prescription  medicines only as told by your health care provider. If you were prescribed an antibiotic medicine, do not stop taking the antibiotic even if you start to feel better.  · Contact a health care provider if your symptoms do not begin to improve within 1-2 days of starting treatment or your symptoms get worse.  · Keep all follow-up visits as told by your health care provider. This is important. These visits let your health care provider make sure that a more serious infection is not developing.  This information is not intended to replace advice given to you by your health care provider. Make sure you discuss any questions you have with your health care provider.  Discharge Instructions    Discharged to home by car with relative. Discharged via wheelchair, hospital escort: Yes.  Special equipment needed: Not Applicable    Be sure to schedule a follow-up appointment with your primary care doctor or any specialists as instructed.     Discharge Plan:   Diet Plan: Discussed  Activity Level: Discussed  Confirmed Follow up Appointment: Appointment Scheduled  Confirmed Symptoms Management: Discussed  Medication Reconciliation Updated: Yes  Influenza Vaccine Indication: Patient Refuses    I understand that a diet low in cholesterol, fat, and sodium is recommended for good health. Unless I have been given specific instructions below for another diet, I accept this instruction as my diet prescription.   Other diet: Regular diet as tolerated.    Special Instructions: None    · Is patient discharged on Warfarin / Coumadin?   No     Depression / Suicide Risk    As you are discharged from this [facility] facility, it is important to learn how to keep safe from harming yourself.    Recognize the warning signs:  · Abrupt changes in personality, positive or negative- including increase in energy   · Giving away possessions  · Change in eating patterns- significant weight changes-  positive or negative  · Change in sleeping patterns-  unable to sleep or sleeping all the time   · Unwillingness or inability to communicate  · Depression  · Unusual sadness, discouragement and loneliness  · Talk of wanting to die  · Neglect of personal appearance   · Rebelliousness- reckless behavior  · Withdrawal from people/activities they love  · Confusion- inability to concentrate     If you or a loved one observes any of these behaviors or has concerns about self-harm, here's what you can do:  · Talk about it- your feelings and reasons for harming yourself  · Remove any means that you might use to hurt yourself (examples: pills, rope, extension cords, firearm)  · Get professional help from the community (Mental Health, Substance Abuse, psychological counseling)  · Do not be alone:Call your Safe Contact- someone whom you trust who will be there for you.  · Call your local CRISIS HOTLINE 967-8612 or 211-193-3133  · Call your local Children's Mobile Crisis Response Team [place] (280) 224-6622 or www.[employer]  · Call the toll free National Suicide Prevention Hotlines   · National Suicide Prevention Lifeline 585-365-WWQY (2416)  · National Hope Line Network 800-SUICIDE (499-7914)

## 2021-12-15 LAB
BACTERIA SPEC ANAEROBE CULT: NORMAL
SIGNIFICANT IND 70042: NORMAL
SITE SITE: NORMAL
SOURCE SOURCE: NORMAL

## 2021-12-17 LAB
BACTERIA BLD CULT: NORMAL
BACTERIA BLD CULT: NORMAL
SIGNIFICANT IND 70042: NORMAL
SIGNIFICANT IND 70042: NORMAL
SITE SITE: NORMAL
SITE SITE: NORMAL
SOURCE SOURCE: NORMAL
SOURCE SOURCE: NORMAL

## 2021-12-23 ENCOUNTER — APPOINTMENT (OUTPATIENT)
Dept: RADIOLOGY | Facility: MEDICAL CENTER | Age: 41
End: 2021-12-23
Attending: EMERGENCY MEDICINE
Payer: MEDICAID

## 2021-12-23 ENCOUNTER — HOSPITAL ENCOUNTER (EMERGENCY)
Facility: MEDICAL CENTER | Age: 41
End: 2021-12-23
Attending: EMERGENCY MEDICINE
Payer: MEDICAID

## 2021-12-23 VITALS
TEMPERATURE: 98.7 F | HEART RATE: 99 BPM | SYSTOLIC BLOOD PRESSURE: 159 MMHG | OXYGEN SATURATION: 98 % | RESPIRATION RATE: 18 BRPM | HEIGHT: 64 IN | DIASTOLIC BLOOD PRESSURE: 89 MMHG | WEIGHT: 128.97 LBS | BODY MASS INDEX: 22.02 KG/M2

## 2021-12-23 DIAGNOSIS — M79.10 MYALGIA: ICD-10-CM

## 2021-12-23 DIAGNOSIS — L08.9 FINGER INFECTION: ICD-10-CM

## 2021-12-23 DIAGNOSIS — J02.9 PHARYNGITIS, UNSPECIFIED ETIOLOGY: ICD-10-CM

## 2021-12-23 DIAGNOSIS — B34.9 VIRAL SYNDROME: ICD-10-CM

## 2021-12-23 LAB
ALBUMIN SERPL BCP-MCNC: 3.7 G/DL (ref 3.2–4.9)
ALBUMIN/GLOB SERPL: 1.1 G/DL
ALP SERPL-CCNC: 109 U/L (ref 30–99)
ALT SERPL-CCNC: 23 U/L (ref 2–50)
ANION GAP SERPL CALC-SCNC: 13 MMOL/L (ref 7–16)
AST SERPL-CCNC: 28 U/L (ref 12–45)
BASOPHILS # BLD AUTO: 0 % (ref 0–1.8)
BASOPHILS # BLD: 0 K/UL (ref 0–0.12)
BILIRUB SERPL-MCNC: <0.2 MG/DL (ref 0.1–1.5)
BLOOD CULTURE HOLD CXBCH: NORMAL
BUN SERPL-MCNC: 12 MG/DL (ref 8–22)
CALCIUM SERPL-MCNC: 9.2 MG/DL (ref 8.5–10.5)
CHLORIDE SERPL-SCNC: 104 MMOL/L (ref 96–112)
CO2 SERPL-SCNC: 24 MMOL/L (ref 20–33)
CREAT SERPL-MCNC: 0.82 MG/DL (ref 0.5–1.4)
CRP SERPL HS-MCNC: 0.41 MG/DL (ref 0–0.75)
EOSINOPHIL # BLD AUTO: 0 K/UL (ref 0–0.51)
EOSINOPHIL NFR BLD: 0 % (ref 0–6.9)
ERYTHROCYTE [DISTWIDTH] IN BLOOD BY AUTOMATED COUNT: 44.3 FL (ref 35.9–50)
FLUAV RNA SPEC QL NAA+PROBE: NEGATIVE
FLUBV RNA SPEC QL NAA+PROBE: NEGATIVE
GLOBULIN SER CALC-MCNC: 3.4 G/DL (ref 1.9–3.5)
GLUCOSE SERPL-MCNC: 103 MG/DL (ref 65–99)
HCG SERPL QL: NEGATIVE
HCT VFR BLD AUTO: 33.6 % (ref 37–47)
HGB BLD-MCNC: 10.8 G/DL (ref 12–16)
LACTATE BLD-SCNC: 2 MMOL/L (ref 0.5–2)
LYMPHOCYTES # BLD AUTO: 1.24 K/UL (ref 1–4.8)
LYMPHOCYTES NFR BLD: 17.2 % (ref 22–41)
MANUAL DIFF BLD: NORMAL
MCH RBC QN AUTO: 25.7 PG (ref 27–33)
MCHC RBC AUTO-ENTMCNC: 32.1 G/DL (ref 33.6–35)
MCV RBC AUTO: 79.8 FL (ref 81.4–97.8)
MONOCYTES # BLD AUTO: 0.31 K/UL (ref 0–0.85)
MONOCYTES NFR BLD AUTO: 4.3 % (ref 0–13.4)
MORPHOLOGY BLD-IMP: NORMAL
NEUTROPHILS # BLD AUTO: 5.65 K/UL (ref 2–7.15)
NEUTROPHILS NFR BLD: 78.5 % (ref 44–72)
NRBC # BLD AUTO: 0 K/UL
NRBC BLD-RTO: 0 /100 WBC
PLATELET # BLD AUTO: 316 K/UL (ref 164–446)
PLATELET BLD QL SMEAR: NORMAL
PMV BLD AUTO: 9.1 FL (ref 9–12.9)
POTASSIUM SERPL-SCNC: 3.6 MMOL/L (ref 3.6–5.5)
PROCALCITONIN SERPL-MCNC: <0.05 NG/ML
PROT SERPL-MCNC: 7.1 G/DL (ref 6–8.2)
RBC # BLD AUTO: 4.21 M/UL (ref 4.2–5.4)
RBC BLD AUTO: NORMAL
RSV RNA SPEC QL NAA+PROBE: NEGATIVE
SARS-COV-2 RNA RESP QL NAA+PROBE: NOTDETECTED
SODIUM SERPL-SCNC: 141 MMOL/L (ref 135–145)
SPECIMEN SOURCE: NORMAL
WBC # BLD AUTO: 7.2 K/UL (ref 4.8–10.8)

## 2021-12-23 PROCEDURE — 70360 X-RAY EXAM OF NECK: CPT

## 2021-12-23 PROCEDURE — 84145 PROCALCITONIN (PCT): CPT

## 2021-12-23 PROCEDURE — 99284 EMERGENCY DEPT VISIT MOD MDM: CPT

## 2021-12-23 PROCEDURE — C9803 HOPD COVID-19 SPEC COLLECT: HCPCS | Performed by: EMERGENCY MEDICINE

## 2021-12-23 PROCEDURE — 85027 COMPLETE CBC AUTOMATED: CPT

## 2021-12-23 PROCEDURE — 85007 BL SMEAR W/DIFF WBC COUNT: CPT

## 2021-12-23 PROCEDURE — 71045 X-RAY EXAM CHEST 1 VIEW: CPT

## 2021-12-23 PROCEDURE — 87040 BLOOD CULTURE FOR BACTERIA: CPT

## 2021-12-23 PROCEDURE — 0241U HCHG SARS-COV-2 COVID-19 NFCT DS RESP RNA 4 TRGT MIC: CPT

## 2021-12-23 PROCEDURE — 84703 CHORIONIC GONADOTROPIN ASSAY: CPT

## 2021-12-23 PROCEDURE — 80053 COMPREHEN METABOLIC PANEL: CPT

## 2021-12-23 PROCEDURE — 83605 ASSAY OF LACTIC ACID: CPT

## 2021-12-23 PROCEDURE — 86140 C-REACTIVE PROTEIN: CPT

## 2021-12-23 ASSESSMENT — FIBROSIS 4 INDEX: FIB4 SCORE: 0.46

## 2021-12-23 NOTE — ED PROVIDER NOTES
ED Provider Note    CHIEF COMPLAINT  Chief Complaint   Patient presents with   • Body Aches     reports aches, joint pain, and some SOB   • Digit Pain     7/10 finger pain, recent history of cut and infection to digit and sepsis     HPI  Pt is a 41-year-old female with a history of hypertension, recent finger infection who presents emergency room for evaluation of body aches, chills, shortness of breath and ongoing finger pain at site of a previous finger infection.  Patient is saying that she has had some swelling and pain along with some sloughing of the skin on her left middle finger.  This is relatively unchanged and she has been compliant with her antibiotics.  Patient came in because she was actually more concerned about her body aches, associated shortness of breath and the sore throat that she was having.  She does not associate this with swallowing her pills, she is not having a burning sensation smell of her throat but she is unvaccinated against Covid and was feeling like she was having some overall illness that was not improved and she was concerned that maybe this was a side effect of her most recent surgical procedure or finger infection.  She is denying any abdominal discomfort, no dysuria.  She circumstantially endorses having some vaginal bleeding though she is due for her.  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    PPE Note: I personally donned full PPE for all patient encounters during this visit, including being clean-shaven with an N95 respirator mask, gloves, and goggles.     Chart review shows that the patient was discharged on 12/12 following admission for incision and drainage of a finger cellulitis.  This showed group B strep on the grow out, blood cultures were unremarkable discharged home on Augmentin.    REVIEW OF SYSTEMS  See HPI for further details. All other systems are negative.     PAST MEDICAL HISTORY   has a past medical history of  "Hypertension, Hypertension, and Pseudotumor cerebri.    SOCIAL HISTORY  Social History     Tobacco Use   • Smoking status: Never Smoker   • Smokeless tobacco: Never Used   Vaping Use   • Vaping Use: Never used   Substance and Sexual Activity   • Alcohol use: Yes     Comment: occasionally    • Drug use: Yes     Comment: meth and weed   • Sexual activity: Not on file       SURGICAL HISTORY   has a past surgical history that includes irrigation & debridement ortho (Right, 12/12/2021).    CURRENT MEDICATIONS  Home Medications     Reviewed by Gregoria Mason R.N. (Registered Nurse) on 12/23/21 at 0246  Med List Status: Not Addressed   Medication Last Dose Status   acetaZOLAMIDE (DIAMOX) 250 MG Tab  Active   hydroCHLOROthiazide (HYDRODIURIL) 25 MG Tab  Active   lisinopril (PRINIVIL) 20 MG Tab  Active                ALLERGIES  Allergies   Allergen Reactions   • Iodine Anaphylaxis     Contrast with Iodine     PHYSICAL EXAM  VITAL SIGNS: /89   Pulse 99   Temp 37.1 °C (98.7 °F) (Temporal)   Resp 18   Ht 1.626 m (5' 4\")   Wt 58.5 kg (128 lb 15.5 oz)   LMP 12/20/2021   SpO2 98%   BMI 22.14 kg/m²   Pulse ox interpretation: I interpret this pulse ox as normal.  Genl: F sitting in gurney comfortably, speaking clearly, appears in no acute distress   Head: NC/AT   ENT: Mucous membranes moist, posterior pharynx erythematous, uvula midline, nares patent bilaterally  Eyes: Normal sclera, pupils equal round reactive to light  Neck: Supple, FROM, bilateral submandibular LAD appreciated  Pulmonary: Lungs are clear to auscultation bilaterally, no wheezes or rhonchi  Chest: No TTP  CV:  RRR (95), no murmur appreciated, pulses 2+ in both upper and lower extremities,  Abdomen: soft, NT/ND; no rebound/guarding, no masses palpated, no HSM   : no CVA or suprapubic tenderness  Musculoskeletal: Pain free ROM of the neck. Moving upper and lower extremities and spontaneous in coordinated fashion  Right Upper Extremity  - Skin: Post " "incisional sutures and incisions are well-healed, there is no overlying erythema, discharge or cellulitic tissue.  There is no pain with palpation along the flexor or extensor tendon sheaths and there is no generalized enlargement of the finger.    - Motor: Full ROM at shoulder, elbow, wrist; 5/5 wrist flexion/extension, thumb IP joint flexion/extension (AIN/PIN), abduction/adduction (ulnar) intact  - Sensation intact to median/ulnar/radial nerves  - 2+ radial pulse, < 2 sec cap refill x 5 digits  Neuro: A&Ox4 (person, place, time, situation), speech fluent, gait steady, no focal deficits appreciated    DIAGNOSTIC STUDIES / PROCEDURES    LABS  Labs Reviewed   CBC WITH DIFFERENTIAL - Abnormal; Notable for the following components:       Result Value    Hemoglobin 10.8 (*)     Hematocrit 33.6 (*)     MCV 79.8 (*)     MCH 25.7 (*)     MCHC 32.1 (*)     Neutrophils-Polys 78.50 (*)     Lymphocytes 17.20 (*)     All other components within normal limits    Narrative:     Collected By:   COMP METABOLIC PANEL - Abnormal; Notable for the following components:    Glucose 103 (*)     Alkaline Phosphatase 109 (*)     All other components within normal limits    Narrative:     Collected By:   LACTIC ACID    Narrative:     Collected By:   LACTIC ACID    Narrative:     Collected By:  Repeat if initial lactic acid result is greater than 2   BLOOD CULTURE    Narrative:     Collected By:  Per Hospital Policy: Only change Specimen Src: to \"Line\" if  specified by physician order.   BLOOD CULTURE    Narrative:     Collected By:  Per Hospital Policy: Only change Specimen Src: to \"Line\" if  specified by physician order.   CRP QUANTITIVE (NON-CARDIAC)    Narrative:     Collected By:   PROCALCITONIN    Narrative:     Collected By:   ESTIMATED GFR    Narrative:     Collected By:   DIFFERENTIAL MANUAL    Narrative:     Collected By:   PERIPHERAL SMEAR REVIEW    Narrative:     Collected By:   PLATELET ESTIMATE    Narrative:     Collected By: "   MORPHOLOGY    Narrative:     Collected By:   COV-2, FLU A/B, AND RSV BY PCR    Narrative:     Collected By:  Have you been in close contact with a person who is suspected  or known to be positive for COVID-19 within the last 30 days  (e.g. last seen that person < 30 days ago)->No   HCG QUAL SERUM    Narrative:     Collected By:   BLOOD CULTURE,HOLD       RADIOLOGY  DX-NECK FOR SOFT TISSUE   Final Result               Apparent thickening of the epiglottic shadow may be on the basis of rotation and motion. Correlate with exam      DX-CHEST-PORTABLE (1 VIEW)   Final Result      1.  No acute cardiac or pulmonary abnormalities are identified.          COURSE & MEDICAL DECISION MAKING  Pertinent Labs & Imaging studies reviewed. (See chart for details)    DDX: Viral syndrome, pneumonia, pill esophagitis, GERD, chronic pain, pregnancy, menses, chronic pain    MDM    Initial evaluation at 0338:  Patient presented to the emergency room for symptoms as described above.  The patient was initially in triage and was complaining of digit pain.  On initial inspection this appears to be chronic and appears to be healing well.  She does not have signs of progressive digit swelling, no purulence is noted, she has full range of motion and intact sensation and has been taking her antibiotics as prescribed.  She was more acutely concerned regarding the possibility of her respiratory complaints and throat pain.  She does have some erythema without anatomical shifts and she could have early pharyngitis or she could have side effects of taking her regular medications or complications of GERD and esophagitis.  While she was initially tachycardic following ministration medication she is feeling substantially improved.  She had additional concerns regarding the possibility of a pregnancy as she just started a heavy menstrual cycle and was concerned that this could be a concurrent miscarriage.  While she is not actively bleeding at this time  and defer the vaginal exam because she was not having any dysuria or vaginal pain.    Lab work shows a stable and chronic anemia, no gross electrolyte derangement, pregnancy test is negative, Covid test and viral panel are also negative.  The patient's CRP and procalcitonin are not elevated and with her medication adherence and believe this is very reassuring for her stable and chronic wound.    Chest x-ray showed no focal infiltrative process, clinically she does not have signs of reactive airway disease and she has not been hypoxic or tachypneic and is unlikely to have PE with no ongoing risk factors.    Neck x-ray does show some slight increase in the epiglottic fold but no other spacing concerning for acute bacterial infection.  When I discussed this with the patient she did have her direct family member with a similar abnormality that eventually became uncomfortable enough to warrant getting surgery for reduction.  She is handling her secretions, her swallow reflex is intact and she has no other developing neck tenderness or impediment to range of motion testing.  Overall, the patient had a multitude of complaints but acutely does not show any signs of septic etiology, developing infection or new acute respiratory distress.  At this time a consult for establishment of care in the outpatient setting along with follow-up instructions for returning to see her surgeon who performed her finger incision.  Questions are addressed at bedside and she is discharged home in stable condition.  And when    FINAL IMPRESSION  Visit Diagnoses     ICD-10-CM   1. Myalgia  M79.10   2. Viral syndrome  B34.9   3. Finger infection  L08.9   4. Pharyngitis, unspecified etiology  J02.9     Electronically signed by: Adams Campbell M.D., 12/23/2021 3:38 AM

## 2021-12-23 NOTE — ED TRIAGE NOTES
Chief Complaint   Patient presents with   • Body Aches     reports aches, joint pain, and some SOB   • Digit Pain     7/10 finger pain, recent history of cut and infection to digit and sepsis     Pt ambulatory to triage for above complaint. She was recently hospitalized for a finger laceration, infection and ensuing sepsis and discharged with antibiotics which she is still taking. Finger appears swollen and infected. Returns today with complaints of generalized joint pain and difficulty catching breath. She believes she is becoming septic again. Denies chills/fever/dizziness/n/v. Tachy and HTN in triage.

## 2021-12-28 NOTE — DOCUMENTATION QUERY
"                                                                         [facility]                                                                       Query Response Note      PATIENT:               INDER OMALLEY  ACCT #:                  [id number]  MRN:                     [id number]  :                      1980  ADMIT DATE:       2021 2:04 AM  DISCH DATE:        2021 2:24 PM  RESPONDING  PROVIDER #:        [id number]           QUERY TEXT:    Pt has documented \"Pneumonia due to COVID 19 positive at outside facility\". PN's state Sepsis source is RT hand. Please clarify status of this condition:    NOTE:  If an appropriate response is not listed below, please respond with a new note.       The patient's Clinical Indicators include:  Clinical indicators:  Positive COVID at outside facility  Pt currently not hypoxic, if gets hypoxic, start decadron    Treatment and monitoring:  SARS PCR not dectected  SARS SWAB negative  IV antibiotics Unasyn and Vancomycin  I&D RT middle finger with excisional debridement    Risk factors:  Sepsis  Cellulitis R hand    [email or web address]  Options provided:   -- PNA due to COVID 19 is ruled in   -- PNA due to COVID 19 is  ruled out   -- Unable to determine      Query created by: Kirsten Lacy on 2021 12:03 PM    RESPONSE TEXT:    PNA due to COVID 19 is ruled out          Electronically signed by:  BARBARA GOMEZ MD 2021 5:44 AM              "

## 2022-01-11 LAB
FUNGUS SPEC CULT: NORMAL
FUNGUS SPEC FUNGUS STN: NORMAL
SIGNIFICANT IND 70042: NORMAL
SITE SITE: NORMAL
SOURCE SOURCE: NORMAL

## 2022-10-21 ENCOUNTER — HOSPITAL ENCOUNTER (INPATIENT)
Facility: MEDICAL CENTER | Age: 42
LOS: 1 days | DRG: 872 | End: 2022-10-22
Attending: EMERGENCY MEDICINE | Admitting: STUDENT IN AN ORGANIZED HEALTH CARE EDUCATION/TRAINING PROGRAM
Payer: MEDICAID

## 2022-10-21 ENCOUNTER — APPOINTMENT (OUTPATIENT)
Dept: RADIOLOGY | Facility: MEDICAL CENTER | Age: 42
DRG: 872 | End: 2022-10-21
Attending: EMERGENCY MEDICINE
Payer: MEDICAID

## 2022-10-21 ENCOUNTER — APPOINTMENT (OUTPATIENT)
Dept: RADIOLOGY | Facility: MEDICAL CENTER | Age: 42
DRG: 872 | End: 2022-10-21
Attending: STUDENT IN AN ORGANIZED HEALTH CARE EDUCATION/TRAINING PROGRAM
Payer: MEDICAID

## 2022-10-21 DIAGNOSIS — L03.115 CELLULITIS OF RIGHT LEG: ICD-10-CM

## 2022-10-21 PROBLEM — D50.9 MICROCYTIC ANEMIA: Status: ACTIVE | Noted: 2022-10-21

## 2022-10-21 PROBLEM — G93.2 PSEUDOTUMOR CEREBRI: Status: ACTIVE | Noted: 2022-10-21

## 2022-10-21 LAB
ALBUMIN SERPL BCP-MCNC: 2.9 G/DL (ref 3.2–4.9)
ALBUMIN/GLOB SERPL: 0.8 G/DL
ALP SERPL-CCNC: 115 U/L (ref 30–99)
ALT SERPL-CCNC: 10 U/L (ref 2–50)
ANION GAP SERPL CALC-SCNC: 11 MMOL/L (ref 7–16)
AST SERPL-CCNC: 10 U/L (ref 12–45)
BASOPHILS # BLD AUTO: 0.2 % (ref 0–1.8)
BASOPHILS # BLD: 0.02 K/UL (ref 0–0.12)
BILIRUB SERPL-MCNC: <0.2 MG/DL (ref 0.1–1.5)
BUN SERPL-MCNC: 14 MG/DL (ref 8–22)
C TRACH DNA SPEC QL NAA+PROBE: NEGATIVE
CALCIUM SERPL-MCNC: 9.1 MG/DL (ref 8.5–10.5)
CHLORIDE SERPL-SCNC: 102 MMOL/L (ref 96–112)
CK SERPL-CCNC: 28 U/L (ref 0–154)
CO2 SERPL-SCNC: 25 MMOL/L (ref 20–33)
CREAT SERPL-MCNC: 0.75 MG/DL (ref 0.5–1.4)
CRP SERPL HS-MCNC: 15.41 MG/DL (ref 0–0.75)
EOSINOPHIL # BLD AUTO: 0.16 K/UL (ref 0–0.51)
EOSINOPHIL NFR BLD: 1.3 % (ref 0–6.9)
ERYTHROCYTE [DISTWIDTH] IN BLOOD BY AUTOMATED COUNT: 43.2 FL (ref 35.9–50)
ERYTHROCYTE [SEDIMENTATION RATE] IN BLOOD BY WESTERGREN METHOD: 97 MM/HOUR (ref 0–25)
GFR SERPLBLD CREATININE-BSD FMLA CKD-EPI: 101 ML/MIN/1.73 M 2
GLOBULIN SER CALC-MCNC: 3.8 G/DL (ref 1.9–3.5)
GLUCOSE SERPL-MCNC: 88 MG/DL (ref 65–99)
HCG UR QL: NEGATIVE
HCT VFR BLD AUTO: 31.3 % (ref 37–47)
HGB BLD-MCNC: 10.2 G/DL (ref 12–16)
HIV 1+2 AB+HIV1 P24 AG SERPL QL IA: NORMAL
IMM GRANULOCYTES # BLD AUTO: 0.11 K/UL (ref 0–0.11)
IMM GRANULOCYTES NFR BLD AUTO: 0.9 % (ref 0–0.9)
LACTATE SERPL-SCNC: 1.2 MMOL/L (ref 0.5–2)
LYMPHOCYTES # BLD AUTO: 1.46 K/UL (ref 1–4.8)
LYMPHOCYTES NFR BLD: 12 % (ref 22–41)
MCH RBC QN AUTO: 26.2 PG (ref 27–33)
MCHC RBC AUTO-ENTMCNC: 32.6 G/DL (ref 33.6–35)
MCV RBC AUTO: 80.5 FL (ref 81.4–97.8)
MONOCYTES # BLD AUTO: 0.59 K/UL (ref 0–0.85)
MONOCYTES NFR BLD AUTO: 4.9 % (ref 0–13.4)
N GONORRHOEA DNA SPEC QL NAA+PROBE: NEGATIVE
NEUTROPHILS # BLD AUTO: 9.79 K/UL (ref 2–7.15)
NEUTROPHILS NFR BLD: 80.7 % (ref 44–72)
NRBC # BLD AUTO: 0 K/UL
NRBC BLD-RTO: 0 /100 WBC
PLATELET # BLD AUTO: 405 K/UL (ref 164–446)
PMV BLD AUTO: 8.8 FL (ref 9–12.9)
POTASSIUM SERPL-SCNC: 3.2 MMOL/L (ref 3.6–5.5)
PROT SERPL-MCNC: 6.7 G/DL (ref 6–8.2)
RBC # BLD AUTO: 3.89 M/UL (ref 4.2–5.4)
SODIUM SERPL-SCNC: 138 MMOL/L (ref 135–145)
SPECIMEN SOURCE: NORMAL
WBC # BLD AUTO: 12.1 K/UL (ref 4.8–10.8)

## 2022-10-21 PROCEDURE — 87040 BLOOD CULTURE FOR BACTERIA: CPT | Mod: 91

## 2022-10-21 PROCEDURE — 700111 HCHG RX REV CODE 636 W/ 250 OVERRIDE (IP): Performed by: GENERAL PRACTICE

## 2022-10-21 PROCEDURE — 770006 HCHG ROOM/CARE - MED/SURG/GYN SEMI*

## 2022-10-21 PROCEDURE — 700102 HCHG RX REV CODE 250 W/ 637 OVERRIDE(OP): Performed by: STUDENT IN AN ORGANIZED HEALTH CARE EDUCATION/TRAINING PROGRAM

## 2022-10-21 PROCEDURE — 96374 THER/PROPH/DIAG INJ IV PUSH: CPT

## 2022-10-21 PROCEDURE — 99223 1ST HOSP IP/OBS HIGH 75: CPT | Mod: GC | Performed by: STUDENT IN AN ORGANIZED HEALTH CARE EDUCATION/TRAINING PROGRAM

## 2022-10-21 PROCEDURE — 86140 C-REACTIVE PROTEIN: CPT

## 2022-10-21 PROCEDURE — 87389 HIV-1 AG W/HIV-1&-2 AB AG IA: CPT

## 2022-10-21 PROCEDURE — 87491 CHLMYD TRACH DNA AMP PROBE: CPT

## 2022-10-21 PROCEDURE — 36415 COLL VENOUS BLD VENIPUNCTURE: CPT

## 2022-10-21 PROCEDURE — 80053 COMPREHEN METABOLIC PANEL: CPT

## 2022-10-21 PROCEDURE — A9270 NON-COVERED ITEM OR SERVICE: HCPCS | Performed by: STUDENT IN AN ORGANIZED HEALTH CARE EDUCATION/TRAINING PROGRAM

## 2022-10-21 PROCEDURE — 99285 EMERGENCY DEPT VISIT HI MDM: CPT

## 2022-10-21 PROCEDURE — 82550 ASSAY OF CK (CPK): CPT

## 2022-10-21 PROCEDURE — 700111 HCHG RX REV CODE 636 W/ 250 OVERRIDE (IP): Performed by: STUDENT IN AN ORGANIZED HEALTH CARE EDUCATION/TRAINING PROGRAM

## 2022-10-21 PROCEDURE — 87591 N.GONORRHOEAE DNA AMP PROB: CPT

## 2022-10-21 PROCEDURE — 81025 URINE PREGNANCY TEST: CPT

## 2022-10-21 PROCEDURE — 700105 HCHG RX REV CODE 258: Performed by: STUDENT IN AN ORGANIZED HEALTH CARE EDUCATION/TRAINING PROGRAM

## 2022-10-21 PROCEDURE — 73700 CT LOWER EXTREMITY W/O DYE: CPT | Mod: RT

## 2022-10-21 PROCEDURE — 83605 ASSAY OF LACTIC ACID: CPT

## 2022-10-21 PROCEDURE — 85025 COMPLETE CBC W/AUTO DIFF WBC: CPT

## 2022-10-21 PROCEDURE — 700105 HCHG RX REV CODE 258: Performed by: EMERGENCY MEDICINE

## 2022-10-21 PROCEDURE — 700105 HCHG RX REV CODE 258: Performed by: GENERAL PRACTICE

## 2022-10-21 PROCEDURE — 700111 HCHG RX REV CODE 636 W/ 250 OVERRIDE (IP): Performed by: EMERGENCY MEDICINE

## 2022-10-21 PROCEDURE — 97602 WOUND(S) CARE NON-SELECTIVE: CPT

## 2022-10-21 PROCEDURE — 93971 EXTREMITY STUDY: CPT | Mod: RT

## 2022-10-21 PROCEDURE — 85652 RBC SED RATE AUTOMATED: CPT

## 2022-10-21 RX ORDER — SODIUM CHLORIDE, SODIUM LACTATE, POTASSIUM CHLORIDE, AND CALCIUM CHLORIDE .6; .31; .03; .02 G/100ML; G/100ML; G/100ML; G/100ML
30 INJECTION, SOLUTION INTRAVENOUS ONCE
Status: COMPLETED | OUTPATIENT
Start: 2022-10-21 | End: 2022-10-21

## 2022-10-21 RX ORDER — SODIUM CHLORIDE, SODIUM LACTATE, POTASSIUM CHLORIDE, AND CALCIUM CHLORIDE .6; .31; .03; .02 G/100ML; G/100ML; G/100ML; G/100ML
1000 INJECTION, SOLUTION INTRAVENOUS ONCE
Status: COMPLETED | OUTPATIENT
Start: 2022-10-21 | End: 2022-10-21

## 2022-10-21 RX ORDER — ACETAZOLAMIDE 250 MG/1
250 TABLET ORAL 2 TIMES DAILY
Status: DISCONTINUED | OUTPATIENT
Start: 2022-10-21 | End: 2022-10-21

## 2022-10-21 RX ORDER — ENOXAPARIN SODIUM 100 MG/ML
40 INJECTION SUBCUTANEOUS DAILY
Status: DISCONTINUED | OUTPATIENT
Start: 2022-10-21 | End: 2022-10-22 | Stop reason: HOSPADM

## 2022-10-21 RX ORDER — HYDROCHLOROTHIAZIDE 25 MG/1
25 TABLET ORAL
Status: DISCONTINUED | OUTPATIENT
Start: 2022-10-21 | End: 2022-10-22 | Stop reason: HOSPADM

## 2022-10-21 RX ORDER — BISACODYL 10 MG
10 SUPPOSITORY, RECTAL RECTAL
Status: DISCONTINUED | OUTPATIENT
Start: 2022-10-21 | End: 2022-10-21

## 2022-10-21 RX ORDER — ACETAMINOPHEN 325 MG/1
650 TABLET ORAL EVERY 6 HOURS PRN
Status: DISCONTINUED | OUTPATIENT
Start: 2022-10-21 | End: 2022-10-22 | Stop reason: HOSPADM

## 2022-10-21 RX ORDER — LISINOPRIL 10 MG/1
10 TABLET ORAL DAILY
Status: DISCONTINUED | OUTPATIENT
Start: 2022-10-21 | End: 2022-10-21

## 2022-10-21 RX ORDER — HYDROCHLOROTHIAZIDE 25 MG/1
25 TABLET ORAL DAILY
Status: DISCONTINUED | OUTPATIENT
Start: 2022-10-21 | End: 2022-10-21

## 2022-10-21 RX ORDER — AMOXICILLIN 250 MG
2 CAPSULE ORAL 2 TIMES DAILY PRN
Status: DISCONTINUED | OUTPATIENT
Start: 2022-10-21 | End: 2022-10-22 | Stop reason: HOSPADM

## 2022-10-21 RX ORDER — BISACODYL 10 MG
10 SUPPOSITORY, RECTAL RECTAL
Status: DISCONTINUED | OUTPATIENT
Start: 2022-10-21 | End: 2022-10-22 | Stop reason: HOSPADM

## 2022-10-21 RX ORDER — LISINOPRIL 20 MG/1
20 TABLET ORAL DAILY
Status: DISCONTINUED | OUTPATIENT
Start: 2022-10-22 | End: 2022-10-22

## 2022-10-21 RX ORDER — POLYETHYLENE GLYCOL 3350 17 G/17G
1 POWDER, FOR SOLUTION ORAL
Status: DISCONTINUED | OUTPATIENT
Start: 2022-10-21 | End: 2022-10-22 | Stop reason: HOSPADM

## 2022-10-21 RX ORDER — POTASSIUM CHLORIDE 20 MEQ/1
40 TABLET, EXTENDED RELEASE ORAL ONCE
Status: COMPLETED | OUTPATIENT
Start: 2022-10-21 | End: 2022-10-21

## 2022-10-21 RX ORDER — POLYETHYLENE GLYCOL 3350 17 G/17G
1 POWDER, FOR SOLUTION ORAL
Status: DISCONTINUED | OUTPATIENT
Start: 2022-10-21 | End: 2022-10-21

## 2022-10-21 RX ORDER — CEFAZOLIN 2 G/1
2 INJECTION, POWDER, FOR SOLUTION INTRAMUSCULAR; INTRAVENOUS ONCE
Status: COMPLETED | OUTPATIENT
Start: 2022-10-21 | End: 2022-10-21

## 2022-10-21 RX ORDER — AMOXICILLIN 250 MG
2 CAPSULE ORAL 2 TIMES DAILY
Status: DISCONTINUED | OUTPATIENT
Start: 2022-10-21 | End: 2022-10-21

## 2022-10-21 RX ADMIN — LISINOPRIL 10 MG: 10 TABLET ORAL at 06:21

## 2022-10-21 RX ADMIN — SODIUM CHLORIDE, POTASSIUM CHLORIDE, SODIUM LACTATE AND CALCIUM CHLORIDE 1000 ML: 600; 310; 30; 20 INJECTION, SOLUTION INTRAVENOUS at 06:25

## 2022-10-21 RX ADMIN — CEFAZOLIN 2 G: 2 INJECTION, POWDER, FOR SOLUTION INTRAMUSCULAR; INTRAVENOUS at 04:20

## 2022-10-21 RX ADMIN — VANCOMYCIN HYDROCHLORIDE 1500 MG: 500 INJECTION, POWDER, LYOPHILIZED, FOR SOLUTION INTRAVENOUS at 08:36

## 2022-10-21 RX ADMIN — CEFAZOLIN 2 G: 2 INJECTION, POWDER, FOR SOLUTION INTRAMUSCULAR; INTRAVENOUS at 11:47

## 2022-10-21 RX ADMIN — SODIUM CHLORIDE 3 G: 900 INJECTION INTRAVENOUS at 18:01

## 2022-10-21 RX ADMIN — ACETAMINOPHEN 650 MG: 325 TABLET, FILM COATED ORAL at 21:28

## 2022-10-21 RX ADMIN — POTASSIUM CHLORIDE 40 MEQ: 1500 TABLET, EXTENDED RELEASE ORAL at 06:21

## 2022-10-21 RX ADMIN — VANCOMYCIN HYDROCHLORIDE 750 MG: 500 INJECTION, POWDER, LYOPHILIZED, FOR SOLUTION INTRAVENOUS at 18:32

## 2022-10-21 RX ADMIN — SODIUM CHLORIDE, POTASSIUM CHLORIDE, SODIUM LACTATE AND CALCIUM CHLORIDE 1914 ML: 600; 310; 30; 20 INJECTION, SOLUTION INTRAVENOUS at 04:02

## 2022-10-21 RX ADMIN — SODIUM CHLORIDE 3 G: 900 INJECTION INTRAVENOUS at 14:19

## 2022-10-21 RX ADMIN — HYDROCHLOROTHIAZIDE 25 MG: 25 TABLET ORAL at 14:18

## 2022-10-21 ASSESSMENT — COGNITIVE AND FUNCTIONAL STATUS - GENERAL
SUGGESTED CMS G CODE MODIFIER DAILY ACTIVITY: CH
SUGGESTED CMS G CODE MODIFIER MOBILITY: CH
MOBILITY SCORE: 24
DAILY ACTIVITIY SCORE: 24

## 2022-10-21 ASSESSMENT — ENCOUNTER SYMPTOMS
EYE PAIN: 0
SHORTNESS OF BREATH: 1
LOSS OF CONSCIOUSNESS: 0
HEMOPTYSIS: 0
COUGH: 0
DEPRESSION: 0
NECK PAIN: 0
BLURRED VISION: 0
DOUBLE VISION: 0
HEARTBURN: 0
DIZZINESS: 0
CLAUDICATION: 0
VOMITING: 0
HEADACHES: 0
NAUSEA: 0
ABDOMINAL PAIN: 0
CHILLS: 0
FEVER: 0
PALPITATIONS: 0
FALLS: 0
SINUS PAIN: 0
WEAKNESS: 0
MYALGIAS: 0
SPUTUM PRODUCTION: 0

## 2022-10-21 ASSESSMENT — LIFESTYLE VARIABLES
AVERAGE NUMBER OF DAYS PER WEEK YOU HAVE A DRINK CONTAINING ALCOHOL: 0
HOW MANY TIMES IN THE PAST YEAR HAVE YOU HAD 5 OR MORE DRINKS IN A DAY: 0
HAVE YOU EVER FELT YOU SHOULD CUT DOWN ON YOUR DRINKING: NO
TOTAL SCORE: 0
HAVE PEOPLE ANNOYED YOU BY CRITICIZING YOUR DRINKING: NO
SUBSTANCE_ABUSE: 0
TOTAL SCORE: 0
EVER FELT BAD OR GUILTY ABOUT YOUR DRINKING: NO
ALCOHOL_USE: NO
CONSUMPTION TOTAL: NEGATIVE
ON A TYPICAL DAY WHEN YOU DRINK ALCOHOL HOW MANY DRINKS DO YOU HAVE: 0
TOTAL SCORE: 0
EVER HAD A DRINK FIRST THING IN THE MORNING TO STEADY YOUR NERVES TO GET RID OF A HANGOVER: NO

## 2022-10-21 ASSESSMENT — PAIN DESCRIPTION - PAIN TYPE
TYPE: ACUTE PAIN
TYPE: ACUTE PAIN

## 2022-10-21 ASSESSMENT — FIBROSIS 4 INDEX: FIB4 SCORE: 0.79

## 2022-10-21 NOTE — PROGRESS NOTES
Bedside report received from ZAHIRA Deal. Assumed care of pt. Pt A/A/Ox 4. Denies pain or discomfort at this time. Call light and personal belongings within reach. Bed in low position with wheels locked.

## 2022-10-21 NOTE — ASSESSMENT & PLAN NOTE
This is Sepsis Present on admission  SIRS criteria identified on my evaluation include: Tachycardia, with heart rate greater than 90 BPM and Leukocytosis, with WBC greater than 12,000  Source is Lower extremity cellulitis  Sepsis protocol initiated  Fluid resuscitation ordered per protocol  Crystalloid Fluid Administration: Fluid resuscitation ordered per standard protocol - 30 mL/kg per current or ideal body weight  IV antibiotics as appropriate for source of sepsis  Reassessment: I have reassessed the patient's hemodynamic status
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
## 2022-10-21 NOTE — ASSESSMENT & PLAN NOTE
Hgb 10 with MCV 80.5  Pt states was previously diagnosed with iron deficiency anemia and was taking iron supplements  Iron 24, ferritin 115  Consider restarting supplement
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
## 2022-10-21 NOTE — ASSESSMENT & PLAN NOTE
BP remains unctonrolled  Continue HCTZ 25mg qd  Will increase Lisinopril to 40mg qd  Notes had run out of home meds for 1-2 months  Continue to monitor closely
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
## 2022-10-21 NOTE — PROGRESS NOTES
"Pharmacy Vancomycin Kinetics Note for 10/21/2022     42 y.o. female on Vancomycin day # 1     Vancomycin Indication (AUC Dosing): Skin/skin structure infection  Vancomycin Indication (Two level/Trough based Dosing):      Provider specified end date: 10/26/22    Active Antibiotics (From admission, onward)      Ordered     Ordering Provider       Fri Oct 21, 2022  6:21 AM    10/21/22 0621  vancomycin (VANCOCIN) 750 mg in  mL IVPB  (vancomycin (VANCOCIN) IV (LD + Maintenance))  EVERY 12 HOURS         Waqar Ravi M.D.       Fri Oct 21, 2022  5:01 AM    10/21/22 0501  vancomycin (VANCOCIN) 1,500 mg in  mL IVPB  (vancomycin (VANCOCIN) IV (LD + Maintenance))  ONCE         Waqar Ravi M.D.       Fri Oct 21, 2022  4:56 AM    10/21/22 0456  MD Alert...Vancomycin per Pharmacy  PHARMACY TO DOSE        Question:  Indication(s) for vancomycin?  Answer:  Skin and soft tissue infection    Waqar Ravi M.D.            Dosing Weight: 63.8 kg (140 lb 10.5 oz)      Admission History: Admitted on 10/21/2022 for Cellulitis [L03.90]  Pertinent history: Patient with purulent wound to lower extremity. Elevated white count and elevated CRP indicative of active infection.    Allergies:     Iodine     Pertinent cultures to date:     Results       Procedure Component Value Units Date/Time    S. Aureus By PCR, Nasal Complete [[id number]]     Order Status: Sent Specimen: Respirate from Respiratory     CULTURE WOUND W/ GRAM STAIN [[id number]]     Order Status: Sent Specimen: Wound from Right Leg     Blood Culture [[id number]] Collected: 10/21/22 0350    Order Status: Sent Specimen: Blood from Peripheral Updated: 10/21/22 0425    Narrative:      2 of 2 blood culture x2  Sites order. Per Hospital Policy:  Only change Specimen Src: to \"Line\" if specified by physician  order.    Blood Culture [[id number]] Collected: 10/21/22 0345    Order Status: Sent Specimen: Blood from Peripheral Updated: 10/21/22 0424    Narrative:      1 of 2 for " "Blood Culture x 2 sites order. Per Hospital  Policy: Only change Specimen Src: to \"Line\" if specified by  physician order.            Labs:     Estimated Creatinine Clearance: 83.5 mL/min (by C-G formula based on SCr of 0.75 mg/dL).  Recent Labs     10/21/22  0350   WBC 12.1*   NEUTSPOLYS 80.70*     Recent Labs     10/21/22  0350   BUN 14   CREATININE 0.75   ALBUMIN 2.9*       Intake/Output Summary (Last 24 hours) at 10/21/2022 0621  Last data filed at 10/21/2022 0540  Gross per 24 hour   Intake 1914 ml   Output --   Net 1914 ml      BP (!) 149/99   Pulse (!) 107   Temp 37.1 °C (98.7 °F) (Temporal)   Resp 16   Ht 1.626 m (5' 4\")   Wt 63.8 kg (140 lb 10.5 oz)   SpO2 96%  Temp (24hrs), Av.8 °C (98.3 °F), Min:36.7 °C (98.1 °F), Max:37.1 °C (98.7 °F)      List concerns for Vancomycin clearance:     None    Pharmacokinetics: AUC Dosing    AUC kinetics:   Ke (hr ^-1): 0.0737 hr^-1  Half life: 9.4 hr  Clearance: 3.056  Estimated TDD: 1528  Estimated Dose: 726  Estimated interval: 11.4      A/P:     -  Vancomycin dose: 750 mg q 12h     -  Next vancomycin level(s): not currently scheduled, consider after 4th maintenance dose      -  Predicted vancomycin AUC from initial AUC test calculator: 491 mg·hr/L      -  Comments: Please continue to monitor renal function, urine output and vancomycin levels for dosing adjustments. Please also follow cultures and signs of clinical cure for de-escalation of therapy.       Kelsey Alvarez, PharmD    "

## 2022-10-21 NOTE — PROGRESS NOTES
4 Eyes Skin Assessment Completed by ZAHIRA Deal and ZAHIRA Heath.    Head WDL  Ears WDL  Nose WDL  Mouth WDL  Neck WDL  Breast/Chest WDL  Shoulder Blades WDL  Spine WDL  (R) Arm/Elbow/Hand WDL  (L) Arm/Elbow/Hand WDL  Abdomen WDL  Groin WDL  Scrotum/Coccyx/Buttocks WDL  (R) Leg Redness, Scab, and Swelling  (L) Leg WDL  (R) Heel/Foot/Toe WDL  (L) Heel/Foot/Toe WDL          Devices In Places SCD's      Interventions In Place N/A    Possible Skin Injury No    Pictures Uploaded Into Epic Yes  Wound Consult Placed Yes  RN Wound Prevention Protocol Ordered Yes

## 2022-10-22 VITALS
RESPIRATION RATE: 22 BRPM | BODY MASS INDEX: 22.51 KG/M2 | OXYGEN SATURATION: 99 % | DIASTOLIC BLOOD PRESSURE: 113 MMHG | TEMPERATURE: 97.1 F | WEIGHT: 131.84 LBS | HEART RATE: 102 BPM | SYSTOLIC BLOOD PRESSURE: 161 MMHG | HEIGHT: 64 IN

## 2022-10-22 PROBLEM — R68.89 THROAT AND MOUTH SYMPTOM: Status: ACTIVE | Noted: 2022-10-22

## 2022-10-22 LAB
ANION GAP SERPL CALC-SCNC: 8 MMOL/L (ref 7–16)
BUN SERPL-MCNC: 7 MG/DL (ref 8–22)
CALCIUM SERPL-MCNC: 8.3 MG/DL (ref 8.5–10.5)
CHLORIDE SERPL-SCNC: 107 MMOL/L (ref 96–112)
CO2 SERPL-SCNC: 25 MMOL/L (ref 20–33)
CREAT SERPL-MCNC: 0.6 MG/DL (ref 0.5–1.4)
ERYTHROCYTE [DISTWIDTH] IN BLOOD BY AUTOMATED COUNT: 43.8 FL (ref 35.9–50)
FERRITIN SERPL-MCNC: 115 NG/ML (ref 10–291)
GFR SERPLBLD CREATININE-BSD FMLA CKD-EPI: 114 ML/MIN/1.73 M 2
GLUCOSE SERPL-MCNC: 91 MG/DL (ref 65–99)
HCT VFR BLD AUTO: 29.5 % (ref 37–47)
HGB BLD-MCNC: 9.2 G/DL (ref 12–16)
HGB RETIC QN AUTO: 25.8 PG/CELL (ref 29–35)
IMM RETICS NFR: 25.3 % (ref 9.3–17.4)
IRON SATN MFR SERPL: 12 % (ref 15–55)
IRON SERPL-MCNC: 24 UG/DL (ref 40–170)
MAGNESIUM SERPL-MCNC: 1.6 MG/DL (ref 1.5–2.5)
MCH RBC QN AUTO: 25.5 PG (ref 27–33)
MCHC RBC AUTO-ENTMCNC: 31.2 G/DL (ref 33.6–35)
MCV RBC AUTO: 81.7 FL (ref 81.4–97.8)
PHOSPHATE SERPL-MCNC: 3.9 MG/DL (ref 2.5–4.5)
PLATELET # BLD AUTO: 381 K/UL (ref 164–446)
PMV BLD AUTO: 8.4 FL (ref 9–12.9)
POTASSIUM SERPL-SCNC: 3.5 MMOL/L (ref 3.6–5.5)
RBC # BLD AUTO: 3.61 M/UL (ref 4.2–5.4)
RETICS # AUTO: 0.04 M/UL (ref 0.04–0.06)
RETICS/RBC NFR: 1.1 % (ref 0.8–2.1)
SODIUM SERPL-SCNC: 140 MMOL/L (ref 135–145)
TIBC SERPL-MCNC: 207 UG/DL (ref 250–450)
UIBC SERPL-MCNC: 183 UG/DL (ref 110–370)
WBC # BLD AUTO: 7.8 K/UL (ref 4.8–10.8)

## 2022-10-22 PROCEDURE — 700102 HCHG RX REV CODE 250 W/ 637 OVERRIDE(OP): Performed by: STUDENT IN AN ORGANIZED HEALTH CARE EDUCATION/TRAINING PROGRAM

## 2022-10-22 PROCEDURE — 80048 BASIC METABOLIC PNL TOTAL CA: CPT

## 2022-10-22 PROCEDURE — A9270 NON-COVERED ITEM OR SERVICE: HCPCS | Performed by: STUDENT IN AN ORGANIZED HEALTH CARE EDUCATION/TRAINING PROGRAM

## 2022-10-22 PROCEDURE — 85046 RETICYTE/HGB CONCENTRATE: CPT

## 2022-10-22 PROCEDURE — 99233 SBSQ HOSP IP/OBS HIGH 50: CPT | Mod: GC | Performed by: INTERNAL MEDICINE

## 2022-10-22 PROCEDURE — 36415 COLL VENOUS BLD VENIPUNCTURE: CPT

## 2022-10-22 PROCEDURE — 700105 HCHG RX REV CODE 258: Performed by: STUDENT IN AN ORGANIZED HEALTH CARE EDUCATION/TRAINING PROGRAM

## 2022-10-22 PROCEDURE — 83550 IRON BINDING TEST: CPT

## 2022-10-22 PROCEDURE — 83540 ASSAY OF IRON: CPT

## 2022-10-22 PROCEDURE — 85027 COMPLETE CBC AUTOMATED: CPT

## 2022-10-22 PROCEDURE — 700111 HCHG RX REV CODE 636 W/ 250 OVERRIDE (IP): Performed by: STUDENT IN AN ORGANIZED HEALTH CARE EDUCATION/TRAINING PROGRAM

## 2022-10-22 PROCEDURE — 82728 ASSAY OF FERRITIN: CPT

## 2022-10-22 PROCEDURE — 84100 ASSAY OF PHOSPHORUS: CPT

## 2022-10-22 PROCEDURE — 83735 ASSAY OF MAGNESIUM: CPT

## 2022-10-22 RX ORDER — POTASSIUM CHLORIDE 20 MEQ/1
40 TABLET, EXTENDED RELEASE ORAL ONCE
Status: COMPLETED | OUTPATIENT
Start: 2022-10-22 | End: 2022-10-22

## 2022-10-22 RX ORDER — ALBUTEROL SULFATE 90 UG/1
2 AEROSOL, METERED RESPIRATORY (INHALATION)
Status: DISCONTINUED | OUTPATIENT
Start: 2022-10-22 | End: 2022-10-22 | Stop reason: HOSPADM

## 2022-10-22 RX ORDER — LISINOPRIL 20 MG/1
40 TABLET ORAL DAILY
Status: DISCONTINUED | OUTPATIENT
Start: 2022-10-23 | End: 2022-10-22 | Stop reason: HOSPADM

## 2022-10-22 RX ORDER — EPINEPHRINE 1 MG/ML(1)
0.3 AMPUL (ML) INJECTION
Status: DISCONTINUED | OUTPATIENT
Start: 2022-10-22 | End: 2022-10-22 | Stop reason: HOSPADM

## 2022-10-22 RX ORDER — IBUPROFEN 800 MG/1
800 TABLET ORAL EVERY 6 HOURS PRN
Status: DISCONTINUED | OUTPATIENT
Start: 2022-10-22 | End: 2022-10-22 | Stop reason: HOSPADM

## 2022-10-22 RX ADMIN — LIDOCAINE HYDROCHLORIDE 15 ML: 20 SOLUTION OROPHARYNGEAL at 08:46

## 2022-10-22 RX ADMIN — SODIUM CHLORIDE 3 G: 900 INJECTION INTRAVENOUS at 00:24

## 2022-10-22 RX ADMIN — SODIUM CHLORIDE 3 G: 900 INJECTION INTRAVENOUS at 12:42

## 2022-10-22 RX ADMIN — LISINOPRIL 20 MG: 20 TABLET ORAL at 05:13

## 2022-10-22 RX ADMIN — HYDROCHLOROTHIAZIDE 25 MG: 25 TABLET ORAL at 05:14

## 2022-10-22 RX ADMIN — VANCOMYCIN HYDROCHLORIDE 750 MG: 500 INJECTION, POWDER, LYOPHILIZED, FOR SOLUTION INTRAVENOUS at 06:47

## 2022-10-22 RX ADMIN — SODIUM CHLORIDE 3 G: 900 INJECTION INTRAVENOUS at 05:13

## 2022-10-22 RX ADMIN — POTASSIUM CHLORIDE 40 MEQ: 1500 TABLET, EXTENDED RELEASE ORAL at 08:47

## 2022-10-22 ASSESSMENT — ENCOUNTER SYMPTOMS
SINUS PAIN: 0
MYALGIAS: 0
FEVER: 0
COUGH: 0
HEMOPTYSIS: 0
VOMITING: 0
DIZZINESS: 0
HEADACHES: 0
WEAKNESS: 0
SHORTNESS OF BREATH: 1
DOUBLE VISION: 0
EYE PAIN: 0
BLURRED VISION: 0
NAUSEA: 0
HEARTBURN: 0
NECK PAIN: 0
PALPITATIONS: 0
CHILLS: 0
ABDOMINAL PAIN: 0

## 2022-10-22 ASSESSMENT — FIBROSIS 4 INDEX: FIB4 SCORE: 0.34

## 2022-10-22 ASSESSMENT — PAIN DESCRIPTION - PAIN TYPE
TYPE: ACUTE PAIN
TYPE: ACUTE PAIN

## 2022-10-22 NOTE — ASSESSMENT & PLAN NOTE
Pt with complaints of swelling and difficulty swallowing  No exam findings to suggest severe swelling or anaphylaxis concern  Epi dose PRN  Continuous pulse ox  Continue to monitor
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
## 2022-10-22 NOTE — PROGRESS NOTES
Pt woke feeling like her throat was swelling. VS within normal limits with exception of elevated BP. Spo2 97% on room air. No visual evidence of facial swelling or swelling inside throat. Pt states feeling began at 0647 after night nurse started Vancomycin. This is pt 3rd dose of Vancomycin with no previous complications. Vancomycin stopped at this time. Physician notified and Resident came to bedside to see pt.

## 2022-10-22 NOTE — CARE PLAN
The patient is Stable - Low risk of patient condition declining or worsening    Shift Goals  Clinical Goals: abx therapy and comfort  Patient Goals: rest and go home  Family Goals: N/A    Progress made toward(s) clinical / shift goals:  Pt medicated for pain per MAR PRN orders and able to rest comfortably.    Problem: Pain - Standard  Goal: Alleviation of pain or a reduction in pain to the patient’s comfort goal  Outcome: Progressing     Problem: Knowledge Deficit - Standard  Goal: Patient and family/care givers will demonstrate understanding of plan of care, disease process/condition, diagnostic tests and medications  Outcome: Progressing       Patient is not progressing towards the following goals:

## 2022-10-22 NOTE — PROGRESS NOTES
Bedside report received from ZAHIRA Barrera. Assumed care of pt. Pt resting quietly with eyes closed. No s/s of distress noted. Call light and personal belongings within reach. Bed in low position with wheels locked.

## 2022-10-22 NOTE — PROGRESS NOTES
Rec'd report from day shift RN. Assumed pt care. Pt assessment complete and pt is AA&Ox4. Pt complains of head ache and requests tylenol. Pt medicated per MAR PRN orders. All needs met. Bed locked, bed in lowest position, call light and personal belongings within reach, and pt refuses treaded socks.

## 2022-10-22 NOTE — CARE PLAN
The patient is Stable - Low risk of patient condition declining or worsening    Shift Goals  Clinical Goals: ABX therapy  Patient Goals: Rest    Progress made toward(s) clinical / shift goals:    Problem: Pain - Standard  Goal: Alleviation of pain or a reduction in pain to the patient’s comfort goal  Outcome: Progressing     Problem: Knowledge Deficit - Standard  Goal: Patient and family/care givers will demonstrate understanding of plan of care, disease process/condition, diagnostic tests and medications  Outcome: Progressing       Patient is not progressing towards the following goals:

## 2022-10-22 NOTE — DISCHARGE PLANNING
Met with pt . She is independent with ADLs and IADLs and has no dc needs.     Care Transition Team Assessment    Information Source  Orientation Level: Oriented X4  Who is responsible for making decisions for patient? : Patient    Readmission Evaluation  Is this a readmission?: No    Elopement Risk  Legal Hold: No  Ambulatory or Self Mobile in Wheelchair: Yes  Disoriented: No  Psychiatric Symptoms: None  History of Wandering: No  Elopement this Admit: No  Vocalizing Wanting to Leave: No  Displays Behaviors, Body Language Wanting to Leave: No-Not at Risk for Elopement  Elopement Risk: Not at Risk for Elopement    Interdisciplinary Discharge Planning  Does Admitting Nurse Feel This Could be a Complex Discharge?: No  Primary Care Physician: None  Lives with - Patient's Self Care Capacity: Spouse  Patient or legal guardian wants to designate a caregiver: No  Support Systems: Spouse / Significant Other  Housing / Facility: [address] [facility]  Do You Take your Prescribed Medications Regularly: No  Able to Return to Previous ADL's: Yes  Mobility Issues: No  Prior Services: Home-Independent  Patient Prefers to be Discharged to:: Home  Assistance Needed: No  Durable Medical Equipment: Not Applicable    Discharge Preparedness  What is your plan after discharge?: Home with help  What are your discharge supports?: Spouse  Prior Functional Level: Ambulatory, Independent with Activities of Daily Living, Independent with Medication Management  Difficulity with ADLs: None  Difficulity with IADLs: None    Functional Assesment  Prior Functional Level: Ambulatory, Independent with Activities of Daily Living, Independent with Medication Management    Finances  Financial Barriers to Discharge: No  Prescription Coverage: Yes    Vision / Hearing Impairment  Vision Impairment : No  Hearing Impairment : No    Values / Beliefs / Concerns  Values / Beliefs Concerns : No    Advance Directive  Advance Directive?: None    Domestic Abuse  Have you  ever been the victim of abuse or violence?: No  Physical Abuse or Sexual Abuse: No  Verbal Abuse or Emotional Abuse: No  Possible Abuse/Neglect Reported to:: Not Applicable         Discharge Risks or Barriers  Discharge risks or barriers?: No  Patient risk factors: Other (comment)    Anticipated Discharge Information  Discharge Disposition: Discharged to home/self care (01)

## 2022-10-22 NOTE — PROGRESS NOTES
Called by nursing staff that pt would like to leave AMA. Per staff, pt stating she was told by orthopedic surgery that I&D was not necessary and reasonable to do antibiotic therapy alone. Pt stated she could do this at home and wanted to leave. Prior to my arrival to bedside to discuss risks and benefits, pt had already left.

## 2022-10-24 ENCOUNTER — PATIENT OUTREACH (OUTPATIENT)
Dept: HEALTH INFORMATION MANAGEMENT | Facility: OTHER | Age: 42
End: 2022-10-24
Payer: MEDICAID

## 2022-10-24 NOTE — PROGRESS NOTES
Community Health Worker Osvaldo attempted to reach the patient after discharge from the hospital to follow up. Patient did not answer and CHW left a detailed voicemail requesting a call back.     [facility] will attempt again at a later date.

## 2022-11-01 NOTE — PROGRESS NOTES
CHW attempted to follow up with the patient after hospitalization. Pt did not answer.     CHW will attempt again.

## 2022-11-05 NOTE — DOCUMENTATION QUERY
[facility]                                                                       Query Response Note      PATIENT:               INDER OMALLEY  ACCT #:                  [id number]  MRN:                     [id number]  :                      1980  ADMIT DATE:       10/21/2022 2:08 AM  DISCH DATE:        10/22/2022 3:12 PM  RESPONDING  PROVIDER #:        [id number]           QUERY TEXT:    Your assistance with confirmation/validation of a documented diagnosis is requested.    Diagnosis:  Sepsis     Based on your medical judgment, can you please clarify in the medical record whether:    Note: If an appropriate response is not listed below, please respond with a new note.    Thank you.      The patient's Clinical Indicators include:  10/22 Discharge summary: Right leg cellulitis, sepsis.  WBC 12.1, lactate 1.2  Temp:  [97.1 °F)-98 °F] 97.1 °F  Pulse:  [] 102  Resp:  [16-22] 22    Risk factor: cellulitis    Treatment: IV abx    Thank you,  Guy Cavazos  Clinical   Connect via [employer] Messenger  Options provided:   -- Sepsis exists, (please document additional + SIRS criteria and clinical indicators)   -- Sepsis does not exist - amended documentation in the medical record and updated problem list   -- Other explanation, Please specify   -- Unable to provide additional clarity regarding the diagnosis   -- Unable to determine      Query created by: Guy Cavazos on 2022 7:24 AM    RESPONSE TEXT:    sepsis exists -          Electronically signed by:  OMI NEWTON MD 2022 10:22 AM

## 2022-11-10 NOTE — PROGRESS NOTES
CHCRIS Rosenberg will discharge patient from CCM services due to lack of contact after x3 TC attempts 11/10/22 .

## 2022-12-28 ENCOUNTER — HOSPITAL ENCOUNTER (INPATIENT)
Facility: MEDICAL CENTER | Age: 42
LOS: 2 days | DRG: 310 | End: 2022-12-30
Attending: EMERGENCY MEDICINE | Admitting: INTERNAL MEDICINE
Payer: MEDICAID

## 2022-12-28 ENCOUNTER — APPOINTMENT (OUTPATIENT)
Dept: RADIOLOGY | Facility: MEDICAL CENTER | Age: 42
DRG: 310 | End: 2022-12-28
Attending: EMERGENCY MEDICINE
Payer: MEDICAID

## 2022-12-28 DIAGNOSIS — I10 UNCONTROLLED HYPERTENSION: ICD-10-CM

## 2022-12-28 DIAGNOSIS — E87.6 HYPOKALEMIA: ICD-10-CM

## 2022-12-28 DIAGNOSIS — R00.2 PALPITATIONS: ICD-10-CM

## 2022-12-28 DIAGNOSIS — I47.10 SVT (SUPRAVENTRICULAR TACHYCARDIA) (HCC): ICD-10-CM

## 2022-12-28 DIAGNOSIS — G93.2 PSEUDOTUMOR CEREBRI: ICD-10-CM

## 2022-12-28 DIAGNOSIS — R00.0 TACHYCARDIA: ICD-10-CM

## 2022-12-28 DIAGNOSIS — F15.10 METHAMPHETAMINE ABUSE (HCC): ICD-10-CM

## 2022-12-28 DIAGNOSIS — R78.81 BACTEREMIA: ICD-10-CM

## 2022-12-28 PROBLEM — F19.10 POLYSUBSTANCE ABUSE (HCC): Status: ACTIVE | Noted: 2022-12-28

## 2022-12-28 LAB
ALBUMIN SERPL BCP-MCNC: 3.7 G/DL (ref 3.2–4.9)
ALBUMIN/GLOB SERPL: 1.2 G/DL
ALP SERPL-CCNC: 89 U/L (ref 30–99)
ALT SERPL-CCNC: 14 U/L (ref 2–50)
AMPHET UR QL SCN: POSITIVE
ANION GAP SERPL CALC-SCNC: 11 MMOL/L (ref 7–16)
AST SERPL-CCNC: 21 U/L (ref 12–45)
BARBITURATES UR QL SCN: NEGATIVE
BASOPHILS # BLD AUTO: 0.2 % (ref 0–1.8)
BASOPHILS # BLD: 0.02 K/UL (ref 0–0.12)
BENZODIAZ UR QL SCN: NEGATIVE
BILIRUB SERPL-MCNC: 0.2 MG/DL (ref 0.1–1.5)
BUN SERPL-MCNC: 12 MG/DL (ref 8–22)
BZE UR QL SCN: NEGATIVE
CALCIUM ALBUM COR SERPL-MCNC: 8 MG/DL (ref 8.5–10.5)
CALCIUM SERPL-MCNC: 7.8 MG/DL (ref 8.5–10.5)
CANNABINOIDS UR QL SCN: NEGATIVE
CHLORIDE SERPL-SCNC: 108 MMOL/L (ref 96–112)
CO2 SERPL-SCNC: 22 MMOL/L (ref 20–33)
CREAT SERPL-MCNC: 0.57 MG/DL (ref 0.5–1.4)
EKG IMPRESSION: NORMAL
EKG IMPRESSION: NORMAL
EOSINOPHIL # BLD AUTO: 0.09 K/UL (ref 0–0.51)
EOSINOPHIL NFR BLD: 0.8 % (ref 0–6.9)
ERYTHROCYTE [DISTWIDTH] IN BLOOD BY AUTOMATED COUNT: 47 FL (ref 35.9–50)
GFR SERPLBLD CREATININE-BSD FMLA CKD-EPI: 115 ML/MIN/1.73 M 2
GLOBULIN SER CALC-MCNC: 3.2 G/DL (ref 1.9–3.5)
GLUCOSE SERPL-MCNC: 98 MG/DL (ref 65–99)
HCT VFR BLD AUTO: 42.1 % (ref 37–47)
HGB BLD-MCNC: 13.7 G/DL (ref 12–16)
IMM GRANULOCYTES # BLD AUTO: 0.03 K/UL (ref 0–0.11)
IMM GRANULOCYTES NFR BLD AUTO: 0.3 % (ref 0–0.9)
LYMPHOCYTES # BLD AUTO: 2.67 K/UL (ref 1–4.8)
LYMPHOCYTES NFR BLD: 24.9 % (ref 22–41)
MCH RBC QN AUTO: 25.7 PG (ref 27–33)
MCHC RBC AUTO-ENTMCNC: 32.5 G/DL (ref 33.6–35)
MCV RBC AUTO: 79 FL (ref 81.4–97.8)
METHADONE UR QL SCN: NEGATIVE
MONOCYTES # BLD AUTO: 0.7 K/UL (ref 0–0.85)
MONOCYTES NFR BLD AUTO: 6.5 % (ref 0–13.4)
NEUTROPHILS # BLD AUTO: 7.21 K/UL (ref 2–7.15)
NEUTROPHILS NFR BLD: 67.3 % (ref 44–72)
NRBC # BLD AUTO: 0 K/UL
NRBC BLD-RTO: 0 /100 WBC
OPIATES UR QL SCN: NEGATIVE
OXYCODONE UR QL SCN: NEGATIVE
PCP UR QL SCN: NEGATIVE
PLATELET # BLD AUTO: 330 K/UL (ref 164–446)
PMV BLD AUTO: 8.7 FL (ref 9–12.9)
POTASSIUM SERPL-SCNC: 2.5 MMOL/L (ref 3.6–5.5)
PROPOXYPH UR QL SCN: NEGATIVE
PROT SERPL-MCNC: 6.9 G/DL (ref 6–8.2)
RBC # BLD AUTO: 5.33 M/UL (ref 4.2–5.4)
SODIUM SERPL-SCNC: 141 MMOL/L (ref 135–145)
TROPONIN T SERPL-MCNC: 11 NG/L (ref 6–19)
WBC # BLD AUTO: 10.7 K/UL (ref 4.8–10.8)

## 2022-12-28 PROCEDURE — 770020 HCHG ROOM/CARE - TELE (206)

## 2022-12-28 PROCEDURE — 700111 HCHG RX REV CODE 636 W/ 250 OVERRIDE (IP): Performed by: EMERGENCY MEDICINE

## 2022-12-28 PROCEDURE — 96366 THER/PROPH/DIAG IV INF ADDON: CPT

## 2022-12-28 PROCEDURE — 700111 HCHG RX REV CODE 636 W/ 250 OVERRIDE (IP): Performed by: INTERNAL MEDICINE

## 2022-12-28 PROCEDURE — 99285 EMERGENCY DEPT VISIT HI MDM: CPT

## 2022-12-28 PROCEDURE — 80053 COMPREHEN METABOLIC PANEL: CPT

## 2022-12-28 PROCEDURE — 80307 DRUG TEST PRSMV CHEM ANLYZR: CPT

## 2022-12-28 PROCEDURE — A9270 NON-COVERED ITEM OR SERVICE: HCPCS | Performed by: INTERNAL MEDICINE

## 2022-12-28 PROCEDURE — 36415 COLL VENOUS BLD VENIPUNCTURE: CPT

## 2022-12-28 PROCEDURE — 99222 1ST HOSP IP/OBS MODERATE 55: CPT | Performed by: INTERNAL MEDICINE

## 2022-12-28 PROCEDURE — 84484 ASSAY OF TROPONIN QUANT: CPT

## 2022-12-28 PROCEDURE — 700105 HCHG RX REV CODE 258: Performed by: EMERGENCY MEDICINE

## 2022-12-28 PROCEDURE — 85025 COMPLETE CBC W/AUTO DIFF WBC: CPT

## 2022-12-28 PROCEDURE — 96365 THER/PROPH/DIAG IV INF INIT: CPT

## 2022-12-28 PROCEDURE — 71045 X-RAY EXAM CHEST 1 VIEW: CPT

## 2022-12-28 PROCEDURE — 96375 TX/PRO/DX INJ NEW DRUG ADDON: CPT

## 2022-12-28 PROCEDURE — 700102 HCHG RX REV CODE 250 W/ 637 OVERRIDE(OP): Performed by: INTERNAL MEDICINE

## 2022-12-28 PROCEDURE — 93005 ELECTROCARDIOGRAM TRACING: CPT | Performed by: EMERGENCY MEDICINE

## 2022-12-28 RX ORDER — SULFAMETHOXAZOLE AND TRIMETHOPRIM 800; 160 MG/1; MG/1
1 TABLET ORAL 2 TIMES DAILY
Status: ON HOLD | COMMUNITY
Start: 2022-11-18 | End: 2022-12-29

## 2022-12-28 RX ORDER — POTASSIUM CHLORIDE 7.45 MG/ML
10 INJECTION INTRAVENOUS ONCE
Status: COMPLETED | OUTPATIENT
Start: 2022-12-28 | End: 2022-12-28

## 2022-12-28 RX ORDER — AMOXICILLIN 250 MG
2 CAPSULE ORAL 2 TIMES DAILY
Status: DISCONTINUED | OUTPATIENT
Start: 2022-12-28 | End: 2022-12-28

## 2022-12-28 RX ORDER — SULFAMETHOXAZOLE AND TRIMETHOPRIM 800; 160 MG/1; MG/1
1 TABLET ORAL 2 TIMES DAILY
Status: SHIPPED | COMMUNITY
Start: 2022-11-18 | End: 2022-12-28

## 2022-12-28 RX ORDER — AMLODIPINE BESYLATE 5 MG/1
5 TABLET ORAL DAILY
Status: DISCONTINUED | OUTPATIENT
Start: 2022-12-29 | End: 2022-12-30 | Stop reason: HOSPADM

## 2022-12-28 RX ORDER — ADENOSINE 3 MG/ML
6 INJECTION, SOLUTION INTRAVENOUS ONCE
Status: COMPLETED | OUTPATIENT
Start: 2022-12-28 | End: 2022-12-28

## 2022-12-28 RX ORDER — SODIUM CHLORIDE 9 MG/ML
2000 INJECTION, SOLUTION INTRAVENOUS ONCE
Status: COMPLETED | OUTPATIENT
Start: 2022-12-28 | End: 2022-12-29

## 2022-12-28 RX ORDER — POTASSIUM CHLORIDE 7.45 MG/ML
10 INJECTION INTRAVENOUS
Status: COMPLETED | OUTPATIENT
Start: 2022-12-28 | End: 2022-12-29

## 2022-12-28 RX ORDER — BISACODYL 10 MG
10 SUPPOSITORY, RECTAL RECTAL
Status: DISCONTINUED | OUTPATIENT
Start: 2022-12-28 | End: 2022-12-30 | Stop reason: HOSPADM

## 2022-12-28 RX ORDER — AMLODIPINE BESYLATE 5 MG/1
5 TABLET ORAL DAILY
Status: ON HOLD | COMMUNITY
Start: 2022-11-18 | End: 2022-12-30 | Stop reason: SDUPTHER

## 2022-12-28 RX ORDER — ENALAPRILAT 1.25 MG/ML
2.5 INJECTION INTRAVENOUS ONCE
Status: DISPENSED | OUTPATIENT
Start: 2022-12-28 | End: 2022-12-29

## 2022-12-28 RX ORDER — POTASSIUM CHLORIDE 20 MEQ/1
40 TABLET, EXTENDED RELEASE ORAL ONCE
Status: COMPLETED | OUTPATIENT
Start: 2022-12-28 | End: 2022-12-28

## 2022-12-28 RX ORDER — POLYETHYLENE GLYCOL 3350 17 G/17G
1 POWDER, FOR SOLUTION ORAL
Status: DISCONTINUED | OUTPATIENT
Start: 2022-12-28 | End: 2022-12-30 | Stop reason: HOSPADM

## 2022-12-28 RX ADMIN — POTASSIUM CHLORIDE 10 MEQ: 7.46 INJECTION, SOLUTION INTRAVENOUS at 22:31

## 2022-12-28 RX ADMIN — SODIUM CHLORIDE 2000 ML: 9 INJECTION, SOLUTION INTRAVENOUS at 19:59

## 2022-12-28 RX ADMIN — POTASSIUM CHLORIDE 10 MEQ: 7.46 INJECTION, SOLUTION INTRAVENOUS at 23:30

## 2022-12-28 RX ADMIN — ADENOSINE 6 MG: 3 INJECTION INTRAVENOUS at 20:03

## 2022-12-28 RX ADMIN — POTASSIUM CHLORIDE 10 MEQ: 7.46 INJECTION, SOLUTION INTRAVENOUS at 20:59

## 2022-12-28 RX ADMIN — RIVAROXABAN 10 MG: 10 TABLET, FILM COATED ORAL at 22:27

## 2022-12-28 RX ADMIN — METOPROLOL TARTRATE 25 MG: 25 TABLET, FILM COATED ORAL at 23:51

## 2022-12-28 RX ADMIN — POTASSIUM CHLORIDE 40 MEQ: 1500 TABLET, EXTENDED RELEASE ORAL at 22:28

## 2022-12-28 ASSESSMENT — ENCOUNTER SYMPTOMS
PALPITATIONS: 1
FEVER: 0
CHILLS: 0
FALLS: 0
VOMITING: 0
ABDOMINAL PAIN: 0
HALLUCINATIONS: 0
LOSS OF CONSCIOUSNESS: 0
NAUSEA: 0
BLURRED VISION: 0
BACK PAIN: 0
COUGH: 0
SHORTNESS OF BREATH: 1
SORE THROAT: 0
DIARRHEA: 0
DOUBLE VISION: 0
WEAKNESS: 0

## 2022-12-28 ASSESSMENT — LIFESTYLE VARIABLES: SUBSTANCE_ABUSE: 0

## 2022-12-28 ASSESSMENT — FIBROSIS 4 INDEX: FIB4 SCORE: 0.36

## 2022-12-29 ENCOUNTER — APPOINTMENT (OUTPATIENT)
Dept: CARDIOLOGY | Facility: MEDICAL CENTER | Age: 42
DRG: 310 | End: 2022-12-29
Attending: HOSPITALIST
Payer: MEDICAID

## 2022-12-29 LAB
ALBUMIN SERPL BCP-MCNC: 3.6 G/DL (ref 3.2–4.9)
BASOPHILS # BLD AUTO: 0.4 % (ref 0–1.8)
BASOPHILS # BLD: 0.03 K/UL (ref 0–0.12)
BUN SERPL-MCNC: 10 MG/DL (ref 8–22)
CALCIUM ALBUM COR SERPL-MCNC: 8.2 MG/DL (ref 8.5–10.5)
CALCIUM SERPL-MCNC: 7.9 MG/DL (ref 8.5–10.5)
CHLORIDE SERPL-SCNC: 106 MMOL/L (ref 96–112)
CO2 SERPL-SCNC: 23 MMOL/L (ref 20–33)
CREAT SERPL-MCNC: 0.56 MG/DL (ref 0.5–1.4)
EOSINOPHIL # BLD AUTO: 0.11 K/UL (ref 0–0.51)
EOSINOPHIL NFR BLD: 1.4 % (ref 0–6.9)
ERYTHROCYTE [DISTWIDTH] IN BLOOD BY AUTOMATED COUNT: 48.4 FL (ref 35.9–50)
GFR SERPLBLD CREATININE-BSD FMLA CKD-EPI: 116 ML/MIN/1.73 M 2
GLUCOSE SERPL-MCNC: 135 MG/DL (ref 65–99)
HCT VFR BLD AUTO: 34.6 % (ref 37–47)
HGB BLD-MCNC: 11 G/DL (ref 12–16)
IMM GRANULOCYTES # BLD AUTO: 0.02 K/UL (ref 0–0.11)
IMM GRANULOCYTES NFR BLD AUTO: 0.3 % (ref 0–0.9)
LV EJECT FRACT  99904: 60
LV EJECT FRACT MOD 2C 99903: 56.92
LV EJECT FRACT MOD 4C 99902: 47.66
LV EJECT FRACT MOD BP 99901: 52.29
LYMPHOCYTES # BLD AUTO: 2.28 K/UL (ref 1–4.8)
LYMPHOCYTES NFR BLD: 29 % (ref 22–41)
MAGNESIUM SERPL-MCNC: 1.5 MG/DL (ref 1.5–2.5)
MCH RBC QN AUTO: 25.6 PG (ref 27–33)
MCHC RBC AUTO-ENTMCNC: 31.8 G/DL (ref 33.6–35)
MCV RBC AUTO: 80.5 FL (ref 81.4–97.8)
MONOCYTES # BLD AUTO: 0.65 K/UL (ref 0–0.85)
MONOCYTES NFR BLD AUTO: 8.3 % (ref 0–13.4)
NEUTROPHILS # BLD AUTO: 4.76 K/UL (ref 2–7.15)
NEUTROPHILS NFR BLD: 60.6 % (ref 44–72)
NRBC # BLD AUTO: 0 K/UL
NRBC BLD-RTO: 0 /100 WBC
PHOSPHATE SERPL-MCNC: 3 MG/DL (ref 2.5–4.5)
PLATELET # BLD AUTO: 297 K/UL (ref 164–446)
PMV BLD AUTO: 9.1 FL (ref 9–12.9)
POTASSIUM SERPL-SCNC: 3.3 MMOL/L (ref 3.6–5.5)
POTASSIUM SERPL-SCNC: 3.3 MMOL/L (ref 3.6–5.5)
RBC # BLD AUTO: 4.3 M/UL (ref 4.2–5.4)
SODIUM SERPL-SCNC: 138 MMOL/L (ref 135–145)
WBC # BLD AUTO: 7.9 K/UL (ref 4.8–10.8)

## 2022-12-29 PROCEDURE — 84132 ASSAY OF SERUM POTASSIUM: CPT

## 2022-12-29 PROCEDURE — 36415 COLL VENOUS BLD VENIPUNCTURE: CPT

## 2022-12-29 PROCEDURE — 93306 TTE W/DOPPLER COMPLETE: CPT | Mod: 26 | Performed by: INTERNAL MEDICINE

## 2022-12-29 PROCEDURE — 700111 HCHG RX REV CODE 636 W/ 250 OVERRIDE (IP): Performed by: INTERNAL MEDICINE

## 2022-12-29 PROCEDURE — 96366 THER/PROPH/DIAG IV INF ADDON: CPT

## 2022-12-29 PROCEDURE — 700111 HCHG RX REV CODE 636 W/ 250 OVERRIDE (IP): Performed by: HOSPITALIST

## 2022-12-29 PROCEDURE — 700102 HCHG RX REV CODE 250 W/ 637 OVERRIDE(OP): Performed by: INTERNAL MEDICINE

## 2022-12-29 PROCEDURE — 83735 ASSAY OF MAGNESIUM: CPT

## 2022-12-29 PROCEDURE — A9270 NON-COVERED ITEM OR SERVICE: HCPCS | Performed by: INTERNAL MEDICINE

## 2022-12-29 PROCEDURE — 770020 HCHG ROOM/CARE - TELE (206)

## 2022-12-29 PROCEDURE — 85025 COMPLETE CBC W/AUTO DIFF WBC: CPT

## 2022-12-29 PROCEDURE — A9270 NON-COVERED ITEM OR SERVICE: HCPCS | Performed by: HOSPITALIST

## 2022-12-29 PROCEDURE — 99232 SBSQ HOSP IP/OBS MODERATE 35: CPT | Performed by: HOSPITALIST

## 2022-12-29 PROCEDURE — 700102 HCHG RX REV CODE 250 W/ 637 OVERRIDE(OP): Performed by: HOSPITALIST

## 2022-12-29 PROCEDURE — 80069 RENAL FUNCTION PANEL: CPT

## 2022-12-29 PROCEDURE — 93306 TTE W/DOPPLER COMPLETE: CPT

## 2022-12-29 RX ORDER — POTASSIUM CHLORIDE 20 MEQ/1
40 TABLET, EXTENDED RELEASE ORAL ONCE
Status: COMPLETED | OUTPATIENT
Start: 2022-12-29 | End: 2022-12-29

## 2022-12-29 RX ORDER — ACETAZOLAMIDE 500 MG/1
500 CAPSULE, EXTENDED RELEASE ORAL 2 TIMES DAILY
Status: DISCONTINUED | OUTPATIENT
Start: 2022-12-29 | End: 2022-12-30 | Stop reason: HOSPADM

## 2022-12-29 RX ORDER — ACETAZOLAMIDE 500 MG/1
500 CAPSULE, EXTENDED RELEASE ORAL 2 TIMES DAILY
Status: ON HOLD | COMMUNITY
End: 2022-12-30 | Stop reason: SDUPTHER

## 2022-12-29 RX ORDER — MAGNESIUM SULFATE HEPTAHYDRATE 40 MG/ML
2 INJECTION, SOLUTION INTRAVENOUS ONCE
Status: COMPLETED | OUTPATIENT
Start: 2022-12-29 | End: 2022-12-29

## 2022-12-29 RX ADMIN — POTASSIUM CHLORIDE 10 MEQ: 7.46 INJECTION, SOLUTION INTRAVENOUS at 02:05

## 2022-12-29 RX ADMIN — METOPROLOL TARTRATE 25 MG: 25 TABLET, FILM COATED ORAL at 17:33

## 2022-12-29 RX ADMIN — POTASSIUM CHLORIDE 40 MEQ: 1500 TABLET, EXTENDED RELEASE ORAL at 09:52

## 2022-12-29 RX ADMIN — RIVAROXABAN 10 MG: 10 TABLET, FILM COATED ORAL at 17:33

## 2022-12-29 RX ADMIN — POTASSIUM CHLORIDE 10 MEQ: 7.46 INJECTION, SOLUTION INTRAVENOUS at 00:50

## 2022-12-29 RX ADMIN — MAGNESIUM SULFATE HEPTAHYDRATE 2 G: 40 INJECTION, SOLUTION INTRAVENOUS at 10:00

## 2022-12-29 RX ADMIN — ACETAZOLAMIDE EXTENDED-RELEASE 500 MG: 500 CAPSULE ORAL at 17:33

## 2022-12-29 RX ADMIN — AMLODIPINE BESYLATE 5 MG: 5 TABLET ORAL at 08:01

## 2022-12-29 RX ADMIN — POTASSIUM CHLORIDE 40 MEQ: 1500 TABLET, EXTENDED RELEASE ORAL at 17:33

## 2022-12-29 ASSESSMENT — PATIENT HEALTH QUESTIONNAIRE - PHQ9
SUM OF ALL RESPONSES TO PHQ9 QUESTIONS 1 AND 2: 0
1. LITTLE INTEREST OR PLEASURE IN DOING THINGS: NOT AT ALL
2. FEELING DOWN, DEPRESSED, IRRITABLE, OR HOPELESS: NOT AT ALL

## 2022-12-29 ASSESSMENT — FIBROSIS 4 INDEX
FIB4 SCORE: 0.81

## 2022-12-29 ASSESSMENT — ENCOUNTER SYMPTOMS
DIZZINESS: 0
TINGLING: 0
PND: 0
BACK PAIN: 0
DEPRESSION: 0
BRUISES/BLEEDS EASILY: 0
FEVER: 0
COUGH: 0
HEMOPTYSIS: 0
VOMITING: 0
DOUBLE VISION: 0
CLAUDICATION: 0
CHILLS: 0
HEADACHES: 0
PALPITATIONS: 0
MYALGIAS: 0
WHEEZING: 0
HEARTBURN: 0
NAUSEA: 0
BLURRED VISION: 0

## 2022-12-29 ASSESSMENT — LIFESTYLE VARIABLES
EVER FELT BAD OR GUILTY ABOUT YOUR DRINKING: NO
HOW MANY TIMES IN THE PAST YEAR HAVE YOU HAD 5 OR MORE DRINKS IN A DAY: 1
TOTAL SCORE: 0
HAVE PEOPLE ANNOYED YOU BY CRITICIZING YOUR DRINKING: NO
CONSUMPTION TOTAL: POSITIVE
TOTAL SCORE: 0
ALCOHOL_USE: YES
ON A TYPICAL DAY WHEN YOU DRINK ALCOHOL HOW MANY DRINKS DO YOU HAVE: 2
AVERAGE NUMBER OF DAYS PER WEEK YOU HAVE A DRINK CONTAINING ALCOHOL: 2
HAVE YOU EVER FELT YOU SHOULD CUT DOWN ON YOUR DRINKING: NO
EVER HAD A DRINK FIRST THING IN THE MORNING TO STEADY YOUR NERVES TO GET RID OF A HANGOVER: NO
TOTAL SCORE: 0
DOES PATIENT WANT TO STOP DRINKING: NO

## 2022-12-29 ASSESSMENT — PAIN DESCRIPTION - PAIN TYPE
TYPE: ACUTE PAIN
TYPE: ACUTE PAIN;CHRONIC PAIN

## 2022-12-29 NOTE — CARE PLAN
Problem: Knowledge Deficit - Standard  Goal: Patient and family/care givers will demonstrate understanding of plan of care, disease process/condition, diagnostic tests and medications  Outcome: Progressing   The patient is Stable - Low risk of patient condition declining or worsening    Shift Goals  Clinical Goals: monitor chest pain  Patient Goals: rest    Progress made toward(s) clinical / shift goals:  Pt educated about plan of care. Pt educated about lab values. Pt demonstrates using call bell appropriately.     Patient is not progressing towards the following goals:

## 2022-12-29 NOTE — ED NOTES
Report to ZAHIRA Stratton, transported to receiving unit on tele monitor, all personal items take with pt

## 2022-12-29 NOTE — ED NOTES
Call to receiving unit [id number], informed that room not clean and RN to come for bedside report once room clean

## 2022-12-29 NOTE — ED NOTES
Ambulated to bathroom with staff, unable to use toilet, commode brought to room and pt able to urinate, UA obtained and sent

## 2022-12-29 NOTE — ED PROVIDER NOTES
"  ER Provider Note    Scribed for Haris Brumfield D.o. by Stu Soto. 12/28/2022  7:04 PM    Primary Care Provider: Pcp Pt States None  Means of Arrival: EMS  History obtained from: Patient    CHIEF COMPLAINT  Chief Complaint   Patient presents with    Palpitations     LIMITATION TO HISTORY   Select: : None    HPI  Saul Conde is a 42 y.o. female who presents to the ED complaining of palpitations onset 45 minutes ago. Patient reports that she has been hospitalized for tachycardia in the past, and was given medication to slow it down. Her last episode of similar symptoms was several years ago. Patient is now experiencing associated lightheadedness and ear ringing. She denies any chest pain, shortness of breath, nausea, vomiting, or diaphoresis. Patient denies any history of myocardial infarction. Patient admits to using methamphetamine.     REVIEW OF SYSTEMS  Pertinent positives include palpitations, ear ringing, and lightheadedness. Pertinent negatives include no chest pain, shortness of breath, nausea, vomiting, or diaphoresis.  All other systems reviewed and negative.     PAST MEDICAL HISTORY  Past Medical History:   Diagnosis Date    Hypertension     Hypertension     Pseudotumor cerebri        SURGICAL HISTORY  Past Surgical History:   Procedure Laterality Date    IRRIGATION & DEBRIDEMENT ORTHO Right 12/12/2021    Procedure: IRRIGATION AND DEBRIDEMENT, WOUND;  Surgeon: Diego Vinson M.D.;  Location: SURGERY [facility];  Service: Orthopedics       FAMILY HISTORY  None noted.    SOCIAL HISTORY   reports that she has never smoked. She has never used smokeless tobacco. She reports current alcohol use. She reports that she does not currently use drugs.    ALLERGIES  Iodine and Bactrim ds    PHYSICAL EXAM  Ht 1.626 m (5' 4\")   Wt 68 kg (150 lb)   LMP 12/28/2022   BMI 25.75 kg/m²   Constitutional: Alert in no apparent distress.  HENT: No signs of trauma, Mucous membranes are moist   Eyes:  " Conjunctiva normal, Non-icteric.   Neck: Normal range of motion, No tenderness, Supple,  Lymphatic: No lymphadenopathy noted.   Cardiovascular: Tachycardic with normal rhythm, no murmurs.   Thorax & Lungs: Normal breath sounds, No respiratory distress, No wheezing, No chest tenderness.   Abdomen: Bowel sounds normal, Soft, No tenderness, No masses, No pulsatile masses. No peritoneal signs.  Skin: Warm, Dry,Normal color  Back: No bony tenderness, No CVA tenderness.   Extremities:No edema, No tenderness, No cyanosis,    Musculoskeletal: Good range of motion in all major joints. No tenderness to palpation or major deformities noted.   Neurologic: Alert ,Oriented x4, Normal motor function, Normal sensory function, No focal deficits noted.   Psychiatric: Affect normal, Judgment normal, Mood normal.      DIAGNOSTIC STUDIES    Labs:   Results for orders placed or performed during the hospital encounter of 12/28/22   CBC with Differential   Result Value Ref Range    WBC 10.7 4.8 - 10.8 K/uL    RBC 5.33 4.20 - 5.40 M/uL    Hemoglobin 13.7 12.0 - 16.0 g/dL    Hematocrit 42.1 37.0 - 47.0 %    MCV 79.0 (L) 81.4 - 97.8 fL    MCH 25.7 (L) 27.0 - 33.0 pg    MCHC 32.5 (L) 33.6 - 35.0 g/dL    RDW 47.0 35.9 - 50.0 fL    Platelet Count 330 164 - 446 K/uL    MPV 8.7 (L) 9.0 - 12.9 fL    Neutrophils-Polys 67.30 44.00 - 72.00 %    Lymphocytes 24.90 22.00 - 41.00 %    Monocytes 6.50 0.00 - 13.40 %    Eosinophils 0.80 0.00 - 6.90 %    Basophils 0.20 0.00 - 1.80 %    Immature Granulocytes 0.30 0.00 - 0.90 %    Nucleated RBC 0.00 /100 WBC    Neutrophils (Absolute) 7.21 (H) 2.00 - 7.15 K/uL    Lymphs (Absolute) 2.67 1.00 - 4.80 K/uL    Monos (Absolute) 0.70 0.00 - 0.85 K/uL    Eos (Absolute) 0.09 0.00 - 0.51 K/uL    Baso (Absolute) 0.02 0.00 - 0.12 K/uL    Immature Granulocytes (abs) 0.03 0.00 - 0.11 K/uL    NRBC (Absolute) 0.00 K/uL   Comp Metabolic Panel   Result Value Ref Range    Sodium 141 135 - 145 mmol/L    Potassium 2.5 (LL) 3.6 - 5.5  mmol/L    Chloride 108 96 - 112 mmol/L    Co2 22 20 - 33 mmol/L    Anion Gap 11.0 7.0 - 16.0    Glucose 98 65 - 99 mg/dL    Bun 12 8 - 22 mg/dL    Creatinine 0.57 0.50 - 1.40 mg/dL    Calcium 7.8 (L) 8.5 - 10.5 mg/dL    AST(SGOT) 21 12 - 45 U/L    ALT(SGPT) 14 2 - 50 U/L    Alkaline Phosphatase 89 30 - 99 U/L    Total Bilirubin 0.2 0.1 - 1.5 mg/dL    Albumin 3.7 3.2 - 4.9 g/dL    Total Protein 6.9 6.0 - 8.2 g/dL    Globulin 3.2 1.9 - 3.5 g/dL    A-G Ratio 1.2 g/dL   URINE DRUG SCREEN   Result Value Ref Range    Amphetamines Urine Positive (A) Negative    Barbiturates Negative Negative    Benzodiazepines Negative Negative    Cocaine Metabolite Negative Negative    Methadone Negative Negative    Opiates Negative Negative    Oxycodone Negative Negative    Phencyclidine -Pcp Negative Negative    Propoxyphene Negative Negative    Cannabinoid Metab Negative Negative   TROPONIN   Result Value Ref Range    Troponin T 11 6 - 19 ng/L   CORRECTED CALCIUM   Result Value Ref Range    Correct Calcium 8.0 (L) 8.5 - 10.5 mg/dL   ESTIMATED GFR   Result Value Ref Range    GFR (CKD-EPI) 115 >60 mL/min/1.73 m 2   EKG   Result Value Ref Range    Report       [facility] Emergency Dept.    Test Date:  2022  Pt Name:    INDER OMALLEY               Department: ER  MRN:        [id number]                      Room:        04  Gender:     Female                       Technician: [id number]  :        1980                   Requested By:ER TRIAGE PROTOCOL  Order #:    [id number]                    Valdemar MD: JOLYNN GANDHI D.O.    Measurements  Intervals                                Axis  Rate:       163                          P:          69  NE:         209                          QRS:        66  QRSD:       95                           T:          62  QT:         316  QTc:        521    Interpretive Statements  SVT  Prolonged NE interval  Consider right atrial enlargement  Left ventricular hypertrophy  ST  depression, consider ischemia, diffuse lds  Prolonged QT interval  Compared to ECG 2021 12:26:43  First degree AV block now present  ST (T wave) deviation now present  Possible ischemia now present  P rolonged QT interval now present  Sinus rhythm no longer present  Electronically Signed On 2022 20:07:17 PST by JOLYNN GANDHI D.O.     EKG   Result Value Ref Range    Report       [facility] Emergency Dept.    Test Date:  2022  Pt Name:    INDER OMALLEY               Department: ER  MRN:        [id number]                      Room:        04  Gender:     Female                       Technician: [id number]  :        1980                   Requested By:JOLYNN GANDHI  Order #:    [id number]                    Reading MD:    Measurements  Intervals                                Axis  Rate:       106                          P:          52  OH:         185                          QRS:        56  QRSD:       93                           T:          56  QT:         374  QTc:        497    Interpretive Statements  Sinus tachycardia  Probable left atrial enlargement  Left ventricular hypertrophy  Borderline prolonged QT interval  Compared to ECG 2022 18:54:05  First degree AV block no longer present  ST (T wave) deviation no longer present  Possible ischemia no longer present          EK Lead EKG: interpreted by me as above.      Radiology:   The attending emergency physician has independently interpreted the diagnostic imaging associated with this visit and am waiting the final reading from the radiologist. DX-Chest as noted below.    DX-CHEST-PORTABLE (1 VIEW)   Final Result      No evidence of acute cardiopulmonary process.         interpreted by the radiologist and reviewed by me.      COURSE & MEDICAL DECISION MAKING     Nursing notes, vital signs, PMSFSHx reviewed in chart     Differential diagnoses include but not limited to Tachycardia, Sinus tachycardia,  atrial flutter, atrial fibrillation, methamphetamine use, electrolyte imbalance.     Prior records reviewed which indicate patient was admitted on 10/21/22 for leg pain with infection.    Barriers to care at this time, including but not limited to: Select: Methamphetamine use .     Diagnostic tests and prescription drugs considered including, but not limited to: Select: HEART Score 3 .    In addition to the chief complaint, the following problems were addressed: Meth abuse.    The attending emergency physician discussed management of the patient with the following physicians and ANDREY's:      Discussion of management with other [facility] or appropriate source(s): Select: Admitting team as noted below      PLAN AND DISPOSITION   7:04 PM - Patient was treated with Adenocard 6 mg for her symptoms. Will order EKG, CMP, CBC with differential, Urine drug screen, Troponin, and DX-Chest to evaluate her complaints.    8:05 PM - Patient's heart rate started off at 160, She was given Adenosine 6 mg IV rapid push, had a short pause, and was then converted to a heart rate of 120 sinus rhythm.    8:12 PM - I discussed the patient's case and the above findings with Dr. Lou (hospitalist) who agrees to admit the patient.      HYDRATION: Based on the patient's presentation of Tachycardia the patient was given IV fluids. IV Hydration was used because oral hydration was not adequate alone. Upon recheck following hydration, the patient was feeling improved.     CRITICAL CARE  The very real possibilty of a deterioration of this patient's condition required the highest level of my preparedness for sudden, emergent intervention.  I provided critical care services, which included medication orders, frequent reevaluations of the patient's condition and response to treatment, ordering and reviewing test results, and discussing the case with various consultants.  The critical care time associated with the care of the patient was 30 minutes.  Review chart for interventions. This time is exclusive of any other billable procedures.      Patient presents with a history of recent methamphetamine use.  She complains of a palpitations.  She is found to have consistent tachycardia at a rate of 160.    She is also hypertensive.    Patient he is hemodynamically stable, and underlying rhythm is difficult to ascertain so plan is for adenosine.  I did speak with the patient concerning the adenosine risk and benefits and she states that she has had it once before.    She was given adenosine, she had a short pause and then she converted to a sinus rhythm heart rate went down to 108.    She remained hypertensive, and patient was given Vasotec IV, and her blood pressure did begin to trend down.    Her potassium is low and she will require IV potassium.  IV potassium was initiated here spoke with hospitalist for admission she will be admitted for further evaluation and treatment    Patient will be hospitalized by Dr. Rubalcava     FINAL IMPRESSION   1. Palpitations    2. Tachycardia    3. SVT (supraventricular tachycardia) (HCC)    4. Uncontrolled hypertension    5. Methamphetamine abuse (HCC)             The note accurately reflects work and decisions made by me.  Haris Brumfield D.O.  12/28/2022  9:18 PM     Stu GIBSON (Derek), am scribing for, and in the presence of, Haris Brumfield D.O..    Electronically signed by: Stu Soto (Derek), 12/28/2022    Haris GIBSON D.O. personally performed the services described in this documentation, as scribed by Stu Soto in my presence, and it is both accurate and complete.

## 2022-12-29 NOTE — ED NOTES
Med Rec Complete per patient  Allergies Reviewed with patient  Patient's Preferred Pharmacy:  [facility]  ([facility])          Patient reports that she recently was prescribed a 10 day course of  Sulfamethoxazole w/ trimethoprim on November 18th, 2022 although she has a documented allergy to this medication.  She reports that she took most of this prescription, but did not take the last 3 doses.

## 2022-12-29 NOTE — ED NOTES
Adenosine admin at 2003, ERP at bedside, pt connected to pads with tele monitoring     Rhythm converted to -120

## 2022-12-29 NOTE — H&P
Hospital Medicine History & Physical Note    Date of Service  12/28/2022    Primary Care Physician  Pcp Pt States None    Code Status  Full Code    Chief Complaint  Chief Complaint   Patient presents with    Palpitations       History of Presenting Illness  Saul Conde is a 42 y.o. female who presented 12/28/2022 with SVT.  Patient has medical history significant for central hypertension, methamphetamine abuse. Patient reports acute onset of palpitations and shortness of breath today.  Patient denies chest pain, nausea, vomiting, diarrhea.  Patient contacted EMS.  In the ED patient's heart rate was in the 160s and was administered adenosine with improvement in heart rate to the 120s.  Patient received appropriate IV hydration.  Discussed case with ER provider and patient will be admitted for further work-up and monitoring.    I discussed the plan of care with patient and ER provider .    Review of Systems  Review of Systems   Constitutional:  Negative for chills and fever.   HENT:  Negative for congestion and sore throat.    Eyes:  Negative for blurred vision and double vision.   Respiratory:  Positive for shortness of breath. Negative for cough.    Cardiovascular:  Positive for palpitations. Negative for chest pain.   Gastrointestinal:  Negative for abdominal pain, diarrhea, nausea and vomiting.   Genitourinary:  Negative for dysuria and frequency.   Musculoskeletal:  Negative for back pain and falls.   Skin:  Negative for rash.   Neurological:  Negative for loss of consciousness and weakness.   Psychiatric/Behavioral:  Negative for hallucinations and substance abuse.      Past Medical History   has a past medical history of Hypertension, Hypertension, and Pseudotumor cerebri.    Surgical History   has a past surgical history that includes irrigation & debridement ortho (Right, 12/12/2021).     Family History  family history is not on file.   Family history reviewed with patient. There is no family  "history that is pertinent to the chief complaint.     Social History   reports that she has never smoked. She has never used smokeless tobacco. She reports current alcohol use. She reports that she does not currently use drugs.    Allergies  Allergies   Allergen Reactions    Iodine Anaphylaxis     Contrast with Iodine    Bactrim Ds Hives     \"Causes anaphylaxis, only slower\"       Medications  None       Physical Exam  Temp:  [36.9 °C (98.5 °F)] 36.9 °C (98.5 °F)  Pulse:  [109-167] 112  Resp:  [12-18] 16  BP: (153-171)/() 157/100  SpO2:  [93 %-100 %] 96 %  Blood Pressure: (!) 158/110   Temperature: 36.9 °C (98.5 °F)   Pulse: (!) 109   Respiration: 14   Pulse Oximetry: 98 %       Physical Exam  Vitals and nursing note reviewed.   Constitutional:       General: She is not in acute distress.     Appearance: She is not toxic-appearing.      Comments: Conversational  Chronically ill appearing   HENT:      Right Ear: External ear normal.      Left Ear: External ear normal.      Nose: No congestion.      Mouth/Throat:      Mouth: Mucous membranes are dry.      Pharynx: No oropharyngeal exudate.   Eyes:      General: No scleral icterus.     Pupils: Pupils are equal, round, and reactive to light.   Cardiovascular:      Rate and Rhythm: Tachycardia present.      Heart sounds: No murmur heard.  Pulmonary:      Breath sounds: No wheezing.   Abdominal:      Tenderness: There is no abdominal tenderness. There is no guarding or rebound.   Musculoskeletal:         General: Swelling present. No tenderness.   Skin:     Coloration: Skin is not jaundiced.      Findings: No bruising.   Neurological:      General: No focal deficit present.      Mental Status: She is alert and oriented to person, place, and time.   Psychiatric:         Mood and Affect: Mood normal.         Behavior: Behavior normal.       Laboratory:  Recent Labs     12/28/22  1900   WBC 10.7   RBC 5.33   HEMOGLOBIN 13.7   HEMATOCRIT 42.1   MCV 79.0*   MCH 25.7* "   MCHC 32.5*   RDW 47.0   PLATELETCT 330   MPV 8.7*     Recent Labs     12/28/22 1900   SODIUM 141   POTASSIUM 2.5*   CHLORIDE 108   CO2 22   GLUCOSE 98   BUN 12   CREATININE 0.57   CALCIUM 7.8*     Recent Labs     12/28/22 1900   ALTSGPT 14   ASTSGOT 21   ALKPHOSPHAT 89   TBILIRUBIN 0.2   GLUCOSE 98         No results for input(s): NTPROBNP in the last 72 hours.      Recent Labs     12/28/22 1900   TROPONINT 11       Imaging:  DX-CHEST-PORTABLE (1 VIEW)   Final Result      No evidence of acute cardiopulmonary process.          X-Ray:  I have personally reviewed the images and compared with prior images.  EKG:  I have personally reviewed the images and compared with prior images.    Assessment/Plan:  Justification for Admission Status  I anticipate this patient will require at least two midnights for appropriate medical management, necessitating inpatient admission because SVT and severe electrolyte abnormalities    Patient will need a  bed on EMERGENCY service .  The need is secondary to SVT.    * SVT (supraventricular tachycardia) (HCC)- (present on admission)  Assessment & Plan  Abated with adenosine  Patient reports similar episode in past    Hypokalemia  Assessment & Plan  Monitor and replace    Polysubstance abuse (HCC)  Assessment & Plan  Patient endorses methamphetamine and cannabis use    Hypertension- (present on admission)  Assessment & Plan  norvasc        VTE prophylaxis: Xarelto 10 mg daily as prophylaxis

## 2022-12-29 NOTE — ASSESSMENT & PLAN NOTE
Abated with adenosine  Patient reports similar episode in past  Likely due to electrolytes abnormalities  Replacing k and Mg  Still low this afternoon  Echo stable.   F/u K ang Mg in am.

## 2022-12-29 NOTE — ED TRIAGE NOTES
Pt BIB [facility] for not feeling well pt tachycardic on monitor and pt brought in for increased HR and dizzziness pt unable to ambulate on own d/t dizzy feelings, hx of methamphetamine use. Last use approx 4-5 hours ago

## 2022-12-29 NOTE — DISCHARGE PLANNING
Care Transition Team Assessment    LSW met with pt at bedside to complete assessment. Pt A&Ox4 and able to verify the information on the face sheet. Pt lives with her S/O in a single story house with five steps to enter. Pt is independent with all ADLs and IADLs. Pt does not use any DME and her S/O provides transportation for her as needed. Pt S/O and mom are both good supports for her.    Pt is currently unemployed and has no source of income. Pt has a history of meth use, however denies any other SA or MH concerns and does not want resources at this time. Pt does not have an advance directive and declined AD packet. Pt confirmed her S/O will be able to provide transportation home upon DC.    Information Source  Orientation Level: Oriented X4  Information Given By: Patient  Informant's Name: Saul Conde  Who is responsible for making decisions for patient? : Patient    Readmission Evaluation  Is this a readmission?: No    Elopement Risk  Legal Hold: No  Ambulatory or Self Mobile in Wheelchair: Yes  Disoriented: No  Psychiatric Symptoms: None  History of Wandering: No  Elopement this Admit: No  Vocalizing Wanting to Leave: No  Displays Behaviors, Body Language Wanting to Leave: No-Not at Risk for Elopement    Interdisciplinary Discharge Planning  Lives with - Patient's Self Care Capacity: Significant Other  Patient or legal guardian wants to designate a caregiver: No  Support Systems: Spouse / Significant Other  Housing / Facility: 1 Story Apartment / Condo    Discharge Preparedness  What is your plan after discharge?: Home with help  What are your discharge supports?: Partner, Parent  Prior Functional Level: Ambulatory, Independent with Activities of Daily Living, Independent with Medication Management  Difficulity with ADLs: None  Difficulity with IADLs: Driving    Functional Assesment  Prior Functional Level: Ambulatory, Independent with Activities of Daily Living, Independent with Medication  Management    Finances  Financial Barriers to Discharge: Yes  Average Monthly Income: 0 $  Source of Income: None  Prescription Coverage: Yes    Vision / Hearing Impairment  Vision Impairment : No  Hearing Impairment : No    Advance Directive  Advance Directive?: None  Advance Directive offered?: AD Booklet refused    Domestic Abuse  Have you ever been the victim of abuse or violence?: No  Physical Abuse or Sexual Abuse: No  Verbal Abuse or Emotional Abuse: No  Possible Abuse/Neglect Reported to:: Not Applicable    Psychological Assessment  History of Substance Abuse: Methamphetamine  History of Psychiatric Problems: No  Non-compliant with Treatment: No  Newly Diagnosed Illness: No    Discharge Risks or Barriers  Discharge risks or barriers?: Substance abuse  Patient risk factors: Substance abuse    Anticipated Discharge Information  Discharge Disposition: Discharged to home/self care (01)

## 2022-12-30 ENCOUNTER — PHARMACY VISIT (OUTPATIENT)
Dept: PHARMACY | Facility: MEDICAL CENTER | Age: 42
End: 2022-12-30
Payer: COMMERCIAL

## 2022-12-30 VITALS
HEIGHT: 64 IN | OXYGEN SATURATION: 98 % | RESPIRATION RATE: 16 BRPM | HEART RATE: 78 BPM | SYSTOLIC BLOOD PRESSURE: 135 MMHG | BODY MASS INDEX: 24.13 KG/M2 | WEIGHT: 141.31 LBS | DIASTOLIC BLOOD PRESSURE: 102 MMHG | TEMPERATURE: 98 F

## 2022-12-30 PROBLEM — I47.10 SVT (SUPRAVENTRICULAR TACHYCARDIA) (HCC): Status: RESOLVED | Noted: 2022-12-28 | Resolved: 2022-12-30

## 2022-12-30 PROBLEM — E87.6 HYPOKALEMIA: Status: RESOLVED | Noted: 2022-12-28 | Resolved: 2022-12-30

## 2022-12-30 LAB
MAGNESIUM SERPL-MCNC: 2.2 MG/DL (ref 1.5–2.5)
POTASSIUM SERPL-SCNC: 3.6 MMOL/L (ref 3.6–5.5)

## 2022-12-30 PROCEDURE — 36415 COLL VENOUS BLD VENIPUNCTURE: CPT

## 2022-12-30 PROCEDURE — 99239 HOSP IP/OBS DSCHRG MGMT >30: CPT | Performed by: HOSPITALIST

## 2022-12-30 PROCEDURE — 700102 HCHG RX REV CODE 250 W/ 637 OVERRIDE(OP): Performed by: HOSPITALIST

## 2022-12-30 PROCEDURE — A9270 NON-COVERED ITEM OR SERVICE: HCPCS | Performed by: INTERNAL MEDICINE

## 2022-12-30 PROCEDURE — 84132 ASSAY OF SERUM POTASSIUM: CPT

## 2022-12-30 PROCEDURE — RXMED WILLOW AMBULATORY MEDICATION CHARGE: Performed by: HOSPITALIST

## 2022-12-30 PROCEDURE — A9270 NON-COVERED ITEM OR SERVICE: HCPCS | Performed by: HOSPITALIST

## 2022-12-30 PROCEDURE — 700102 HCHG RX REV CODE 250 W/ 637 OVERRIDE(OP): Performed by: INTERNAL MEDICINE

## 2022-12-30 PROCEDURE — 83735 ASSAY OF MAGNESIUM: CPT

## 2022-12-30 RX ORDER — POTASSIUM CHLORIDE 20 MEQ/1
10 TABLET, EXTENDED RELEASE ORAL DAILY
Qty: 5 TABLET | Refills: 0 | Status: SHIPPED | OUTPATIENT
Start: 2022-12-30 | End: 2023-01-09

## 2022-12-30 RX ORDER — POTASSIUM CHLORIDE 20 MEQ/1
40 TABLET, EXTENDED RELEASE ORAL ONCE
Status: DISCONTINUED | OUTPATIENT
Start: 2022-12-30 | End: 2022-12-30 | Stop reason: HOSPADM

## 2022-12-30 RX ORDER — AMLODIPINE BESYLATE 5 MG/1
5 TABLET ORAL DAILY
Qty: 30 TABLET | Refills: 0 | Status: SHIPPED | OUTPATIENT
Start: 2022-12-30 | End: 2023-02-25 | Stop reason: SDUPTHER

## 2022-12-30 RX ORDER — ACETAZOLAMIDE 500 MG/1
500 CAPSULE, EXTENDED RELEASE ORAL 2 TIMES DAILY
Qty: 60 CAPSULE | Refills: 0 | Status: SHIPPED | OUTPATIENT
Start: 2022-12-30

## 2022-12-30 RX ADMIN — ACETAZOLAMIDE EXTENDED-RELEASE 500 MG: 500 CAPSULE ORAL at 05:00

## 2022-12-30 RX ADMIN — METOPROLOL TARTRATE 25 MG: 25 TABLET, FILM COATED ORAL at 05:00

## 2022-12-30 RX ADMIN — AMLODIPINE BESYLATE 5 MG: 5 TABLET ORAL at 05:00

## 2022-12-30 NOTE — HOSPITAL COURSE
Saul Conde is a 42 y.o. female who presented 12/28/2022 with SVT.  Patient has medical history significant for central hypertension, methamphetamine abuse. Patient reports acute onset of palpitations and shortness of breath today.  Patient denies chest pain, nausea, vomiting, diarrhea.  Patient contacted EMS.  In the ED patient's heart rate was in the 160s and was administered adenosine with improvement in heart rate to the 120s.  Patient received appropriate IV hydration.  Discussed case with ER provider and patient will be admitted for further work-up and monitoring.

## 2022-12-30 NOTE — CARE PLAN
The patient is Stable - Low risk of patient condition declining or worsening    Shift Goals  Clinical Goals: stable vitals, no chest pain  Patient Goals: rest  Family Goals: efrain    Progress made toward(s) clinical / shift goals:    Patient will remain free from falls and have stable vitals    Patient is not progressing towards the following goals:

## 2022-12-30 NOTE — PROGRESS NOTES
[facility] Daily Progress Note    Date of Service  12/29/2022    Chief Complaint  Saul Conde is a 42 y.o. female admitted 12/28/2022 with svt    Hospital Course  Saul Conde is a 42 y.o. female who presented 12/28/2022 with SVT.  Patient has medical history significant for central hypertension, methamphetamine abuse. Patient reports acute onset of palpitations and shortness of breath today.  Patient denies chest pain, nausea, vomiting, diarrhea.  Patient contacted EMS.  In the ED patient's heart rate was in the 160s and was administered adenosine with improvement in heart rate to the 120s.  Patient received appropriate IV hydration.  Discussed case with ER provider and patient will be admitted for further work-up and monitoring.    Interval Problem Update  12/29 in bed, feels weak, no dizziness or lightheadeness. No chest pain or palpiation, echo stable, electrolytes still low, will continue replacing f/u k and mg in am if stable celestine probably dc home in am.     I have discussed this patient's plan of care and discharge plan at IDT rounds today with Case Management, Nursing, Nursing leadership, and other members of the IDT team.    Consultants/Specialty  none    Code Status  Full Code    Disposition  Patient is not medically cleared for discharge.   Anticipate discharge to to home with close outpatient follow-up.  I have placed the appropriate orders for post-discharge needs.    Review of Systems  Review of Systems   Constitutional:  Negative for chills and fever.   HENT:  Negative for congestion and nosebleeds.    Eyes:  Negative for blurred vision and double vision.   Respiratory:  Negative for cough, hemoptysis and wheezing.    Cardiovascular:  Negative for chest pain, palpitations, claudication, leg swelling and PND.   Gastrointestinal:  Negative for heartburn, nausea and vomiting.   Genitourinary:  Negative for hematuria and urgency.   Musculoskeletal:  Negative for back pain and  myalgias.   Skin:  Negative for rash.   Neurological:  Negative for dizziness, tingling and headaches.   Endo/Heme/Allergies:  Does not bruise/bleed easily.   Psychiatric/Behavioral:  Negative for depression and suicidal ideas.       Physical Exam  Temp:  [36.4 °C (97.6 °F)-36.9 °C (98.5 °F)] 36.7 °C (98.1 °F)  Pulse:  [] 86  Resp:  [12-20] 18  BP: (129-175)/() 138/98  SpO2:  [92 %-100 %] 95 %    Physical Exam  Vitals and nursing note reviewed.   Constitutional:       General: She is not in acute distress.     Appearance: Normal appearance. She is not ill-appearing.   HENT:      Head: Normocephalic and atraumatic.      Nose: No congestion or rhinorrhea.      Mouth/Throat:      Pharynx: Oropharynx is clear. No oropharyngeal exudate or posterior oropharyngeal erythema.   Eyes:      General: No scleral icterus.        Right eye: No discharge.         Left eye: No discharge.      Conjunctiva/sclera: Conjunctivae normal.   Cardiovascular:      Rate and Rhythm: Normal rate and regular rhythm.      Pulses: Normal pulses.      Heart sounds: Normal heart sounds.   Pulmonary:      Effort: Pulmonary effort is normal. No respiratory distress.      Breath sounds: Normal breath sounds. No wheezing.   Abdominal:      General: Bowel sounds are normal. There is no distension.      Palpations: Abdomen is soft.      Tenderness: There is no abdominal tenderness. There is no guarding.   Musculoskeletal:         General: Normal range of motion.      Cervical back: Normal range of motion and neck supple. No rigidity or tenderness.      Right lower leg: No edema.      Left lower leg: No edema.   Skin:     General: Skin is warm and dry.      Capillary Refill: Capillary refill takes less than 2 seconds.      Coloration: Skin is not jaundiced.   Neurological:      General: No focal deficit present.      Mental Status: She is alert and oriented to person, place, and time.      Cranial Nerves: No cranial nerve deficit.      Motor:  No weakness.   Psychiatric:         Mood and Affect: Mood normal.         Behavior: Behavior normal.       Fluids    Intake/Output Summary (Last 24 hours) at 12/29/2022 1657  Last data filed at 12/28/2022 2218  Gross per 24 hour   Intake 100 ml   Output --   Net 100 ml       Laboratory  Recent Labs     12/28/22  1900 12/29/22  0320   WBC 10.7 7.9   RBC 5.33 4.30   HEMOGLOBIN 13.7 11.0*   HEMATOCRIT 42.1 34.6*   MCV 79.0* 80.5*   MCH 25.7* 25.6*   MCHC 32.5* 31.8*   RDW 47.0 48.4   PLATELETCT 330 297   MPV 8.7* 9.1     Recent Labs     12/28/22  1900 12/29/22  0320 12/29/22  1229   SODIUM 141 138  --    POTASSIUM 2.5* 3.3* 3.3*   CHLORIDE 108 106  --    CO2 22 23  --    GLUCOSE 98 135*  --    BUN 12 10  --    CREATININE 0.57 0.56  --    CALCIUM 7.8* 7.9*  --                    Imaging  EC-ECHOCARDIOGRAM COMPLETE W/O CONT   Final Result      DX-CHEST-PORTABLE (1 VIEW)   Final Result      No evidence of acute cardiopulmonary process.           Assessment/Plan  * SVT (supraventricular tachycardia) (HCC)- (present on admission)  Assessment & Plan  Abated with adenosine  Patient reports similar episode in past  Likely due to electrolytes abnormalities  Replacing k and Mg  Still low this afternoon  Echo stable.   F/u K ang Mg in am.     Hypokalemia  Assessment & Plan  Monitor and replace    Polysubstance abuse (HCC)  Assessment & Plan  Patient endorses methamphetamine and cannabis use    Hypertension- (present on admission)  Assessment & Plan  norvasc         VTE prophylaxis: Xarelto 10 mg daily as prophylaxis    I have performed a physical exam and reviewed and updated ROS and Plan today (12/29/2022). In review of yesterday's note (12/28/2022), there are no changes except as documented above.

## 2022-12-31 NOTE — DISCHARGE SUMMARY
Discharge Summary    CHIEF COMPLAINT ON ADMISSION  Chief Complaint   Patient presents with    Palpitations       Reason for Admission  EMS     Admission Date  12/28/2022    CODE STATUS  Full Code    HPI & HOSPITAL COURSE  Please schedule H&P for specific information  Saul Conde is a 42 y.o. female who presented 12/28/2022 with SVT.  Patient has medical history significant for central hypertension, methamphetamine abuse. Patient reports acute onset of palpitations and shortness of breath today.  Patient denies chest pain, nausea, vomiting, diarrhea.  Patient contacted EMS.  In the ED patient's heart rate was in the 160s and was administered adenosine with improvement in heart rate to the 120s.  Patient received appropriate IV hydration.  Discussed case with ER provider and patient will be admitted for further work-up and monitoring.  Patient's heart rate back to normal, patient started on low-dose metoprolol with holding parameters, echocardiogram showed normal ejection fraction, patient symptoms likely related to electrolyte abnormalities, magnesium and potassium has been replaced, patient will be discharged today, patient will continue low-dose potassium for short period of time, she needs to follow-up with [facility], needs to follow-up with primary care physician, patient's kidney function is within normal limits, patient is alert oriented follows commands, she is able to speak in full sentences, denies any dizziness lightheadedness, she has been able to ambulate, patient has history of pseudotumor cerebri and acetazolamide prescription has been refilled, patient again has expressed understanding of the need to follow-up with primary care physician as soon as possible I have placed a referral and contact information to patient for [facility], patient is recommended to avoid alcohol smoking and any type of drugs, patient again has expressed understanding of her discharge plan and agree with it  "request have been answered.        Therefore, she is discharged in good and stable condition to home with close outpatient follow-up.    The patient met 2-midnight criteria for an inpatient stay at the time of discharge.    Discharge Date  12/30/2022    FOLLOW UP ITEMS POST DISCHARGE  Primary care physician  BmP to follow-up with primary care physician    DISCHARGE DIAGNOSES  Principal Problem (Resolved):    SVT (supraventricular tachycardia) (HCC) POA: Yes  Active Problems:    Hypertension POA: Yes    Polysubstance abuse (HCC) POA: Unknown  Resolved Problems:    Hypokalemia POA: Unknown      FOLLOW UP  No future appointments.  [facility]  [address]  [place] [address]  Follow up in 1 week(s)  for primary care  needs follow up post hospital discharge  need follow up BMP.      MEDICATIONS ON DISCHARGE     Medication List        START taking these medications        Instructions   metoprolol tartrate 25 MG Tabs  Commonly known as: LOPRESSOR   Take 0.5 Tablets by mouth 2 times a day for 30 days. Hold medication for heart rate less than 75 and for blood pressure less than 120/60  Dose: 12.5 mg     potassium chloride SA 20 MEQ Tbcr  Commonly known as: Kdur   Take 0.5 Tablets by mouth every day for 10 days.  Dose: 10 mEq            CONTINUE taking these medications        Instructions   acetaZOLAMIDE  MG Cp12  Commonly known as: DIAMOX   Take 1 Capsule by mouth 2 times a day.  Dose: 500 mg     amLODIPine 5 MG Tabs  Commonly known as: NORVASC   Take 1 Tablet by mouth every day.  Dose: 5 mg              Allergies  Allergies   Allergen Reactions    Iodine Anaphylaxis     Contrast with Iodine    Bactrim Ds Hives     \"Causes anaphylaxis, only slower\"       DIET  Orders Placed This Encounter   Procedures    Diet Order Diet: Regular     Standing Status:   Standing     Number of Occurrences:   1     Order Specific Question:   Diet:     Answer:   Regular [1]       ACTIVITY  As tolerated.  Weight " bearing as tolerated    CONSULTATIONS  None    PROCEDURES  None    LABORATORY  Lab Results   Component Value Date    SODIUM 138 12/29/2022    POTASSIUM 3.6 12/30/2022    CHLORIDE 106 12/29/2022    CO2 23 12/29/2022    GLUCOSE 135 (H) 12/29/2022    BUN 10 12/29/2022    CREATININE 0.56 12/29/2022        Lab Results   Component Value Date    WBC 7.9 12/29/2022    HEMOGLOBIN 11.0 (L) 12/29/2022    HEMATOCRIT 34.6 (L) 12/29/2022    PLATELETCT 297 12/29/2022        Total time of the discharge process exceeds 40 minutes.

## 2023-01-04 ENCOUNTER — PATIENT OUTREACH (OUTPATIENT)
Dept: HEALTH INFORMATION MANAGEMENT | Facility: OTHER | Age: 43
End: 2023-01-04
Payer: MEDICAID

## 2023-01-09 NOTE — PROGRESS NOTES
MELECIO Riley introduced community care management to the patient. Pt asked for CCM contact information as she not available at this time. CHW sent a text message with CCM contact info and will wait for a call back.   Pt's cell phone number is [phone].

## 2023-01-11 NOTE — PROGRESS NOTES
CHW Vinson called the patient on her cell phone [phone]. Pt answered the phone and stated that it was a good time to talk after CHW asked if she had a few moments. CHW introduced the program & asked the patient if she would like to be scheduled with a primary care provider. There was a long pause and then the phone hung up.   CHW attempted to reach the patient x 3 times after that incident but the phone rings and goes to voicemail.   CHW has sent CCM contact information to the patient via text.     CHW will remove the patient from CCM caseload due to more than 3 failed attempts.

## 2023-02-08 NOTE — ED NOTES
[facility]    Subjective:      Patient: Kasandra Jones Patient PCP: Sanjiv Ford III, MD         :  1949     Sex:  female      MRN:  [id number]          Date of Visit: 2023      Chief Complaint:  Dizziness    Patient ID: Kasandra Jones is a 73 y.o. female lifelong NON-smoker with a prior history of bipolar 1 disorder, headaches, spinal cord mass, cognitive impairment, cerebral aneurysm, PAF, coronary artery stenosis, brain tumor, hypothyroidism and a long history of hearing loss and disequilibrium self-referred for evaluation of dizziness.  No audiogram but records show she was seen by Dr. Franco 2021 for bilateral tinnitus and hearing loss (no notes are available).  Most recent central neurologic imaging is an MRI of the brain with and without contrast completed 2023 which is reportedly stable compared to 2016 with a 6 X 5 X 5 mm proximal cervical cord enhancing mass on the ventral surface slightly below the craniocervical junction as well as chronic cerebral atrophy and deep white matter ischemia and gliosis of a moderate degree.  Limited images of the IAC and CPA on T2 axial imaging show no appreciable asymmetry or mass.  There is fairly significant degree of inflammatory change of the ethmoid sinuses noted with no appreciable fluid levels or any sphenoid sinusitis noted.  Reports she had a cold at the time of that study.      Patient's main complaint is unsteadiness on standing and walking.  She does not feel any dizziness when she is lying down feels it is somewhat when she sitting and feels it dramatically when she standing feeling she is going to fall.  Does have a slight sense of her brain rotating inside her head at times.  She does have hearing loss and had hearing aids fitted here in town with Dr. Franco's office but has not worn them because they hurt her left ear and she does not feel they make sounds any louder.  No audio for review.  She has significant fear of fall with  Patient ambulated to [place] [address] without assistance.    this progressive weakness.  She is not been seen by Neurology nor has she had physical therapy.  She is using a rolling walker chair.    Review of Systems     Past Medical History  She has a past medical history of Cataract.    Family / Surgical / Social History  Her family history includes Breast cancer in her maternal aunt.    Past Surgical History:   Procedure Laterality Date    EYE SURGERY Bilateral 2020 August    cataract removal    HYSTERECTOMY      TOTAL REDUCTION MAMMOPLASTY  2002       Social History     Tobacco Use    Smoking status: Never    Smokeless tobacco: Never   Substance and Sexual Activity    Alcohol use: Never    Drug use: Never    Sexual activity: Yes     Partners: Male       Medications  She has a current medication list which includes the following prescription(s): alprazolam, bupropion, cholecalciferol (vitamin d3), b12, diphenoxylate-atropine 2.5-0.025 mg, divalproex, eliquis, ferrous sulfate, levothyroxine, magnesium oxide, meclizine, multaq, potassium chloride sa, rabeprazole, trazodone, and vit c,a-wb-dvrwc-lutein-zeaxan.      Allergies  Review of patient's allergies indicates:   Allergen Reactions    Niacin preparations     Penicillins        All medications, allergies, and past history have been reviewed.    Objective:      Vitals:  Vitals - 1 value per visit 2/8/2023 2/8/2023 2/8/2023   SYSTOLIC 132 143 120   DIASTOLIC 66 71 72   Pulse 76 74 79   Temp - - -   Resp - - -   SPO2 - - -   Weight (lb) - - -   Weight (kg) - - -   Height - - -   BMI (Calculated) - - -   VISIT REPORT - - -   Pain Score  - - -   Some recent data might be hidden       There is no height or weight on file to calculate BSA.    Physical Exam:    GENERAL  APPEARANCE -  alert, appears stated age, and cooperative  BARRIER(S) TO COMMUNICATION -  none VOICE - rough and strained    INTEGUMENTARY  no suspicious head and neck lesions    HEENT  HEAD: Normocephalic, without obvious abnormality, atraumatic  FACE: INSPECTION -  Symmetric, no signs of trauma, no suspicious lesion(s)  PALPATION -  No masses SALIVARY GLANDS - non-tender with no appreciable mass  STRENGTH - facial symmetry  NECK/THYROID: normal atraumatic, no neck masses, normal thyroid, no jvd    EYES  Normal occular alignment and mobility with no visible nystagmus at rest    EARS/NOSE/MOUTH/THROAT  EARS  PINNAE AND EXTERNAL EARS - no suspicious lesion OTOSCOPIC EXAM (surgical microscopy was not used for visualization/instrumentation): EAR EXAM - Normal ear canals, tympanic membranes and mobility, and middle ear spaces bilaterally.  HEARING - grossly intact to voice/finger rub    NOSE AND SINUSES  EXTERNAL NOSE - Grossly normal for age/sex  SEPTUM - normal/no obstruction on anterior exam without decongestion TURBINATES - within normal limits MUCOSA - within normal limits     MOUTH AND THROAT   ORAL CAVITY, LIPS, TEETH, GUMS & TONGUE - moist, no suspicious lesions  OROPHARYNX /TONSILS/PHARYNGEAL WALLS/HYPOPHARYNX - no erythema or exudates  NASOPHARYNX - limited mirror exam - unable to visualize due to anatomy/gag  LARYNX -  - limited mirror exam - unable to visualize due to anatomy/gag      CHEST AND LUNG   INSPECTION & AUSCULTATION - normal effort, no stridor    CARDIOVASCULAR  AUSCULTATION & PERIPHERAL VASCULAR - regular rate and rhythm.    NEUROLOGIC  MENTAL STATUS - alert, interactive, psychomotor retardation (minimal) CRANIAL NERVES - normal    CLIVE-HALLPIKE - RIGHT - no nystagmus LEFT - no nystagmus LIGHTHEADED ON RISING BILATERALLY, NO NYSTAGMUS OR VERTIGO  HEAD THRUSTS - Catch up saccades absent bilaterally   FUKUDA - UNABLE TO DO WITH POOR COORDINATION/WEAKNESS  RHOMBERG - Tandem (no) - MILD BUT NO FALL  GAIT - IMPAIRED  STRENGTH - MILD GENERALIZED WEAKNESS BUT MOST NOTABLY ON HIP FLEXORS WERE STRENGTH IS MARKEDLY REDUCED  CEREBELLAR - normal no diadochokinesia     LYMPHATIC  HEAD AND NECK - non-palpable      Procedure(s):  None    Labs:  WBC   Date Value Ref Range  Status   01/03/2023 4.75 3.90 - 12.70 K/uL Final     Hemoglobin   Date Value Ref Range Status   01/03/2023 14.0 12.0 - 16.0 g/dL Final     Platelets   Date Value Ref Range Status   01/03/2023 195 150 - 450 K/uL Final     Creatinine   Date Value Ref Range Status   01/03/2023 0.7 0.5 - 1.4 mg/dL Final     TSH   Date Value Ref Range Status   01/03/2023 3.510 0.340 - 5.600 uIU/mL Final     Glucose   Date Value Ref Range Status   01/03/2023 99 70 - 110 mg/dL Final         Assessment:      Problem List Items Addressed This Visit          ENT    Vertigo     Other Visit Diagnoses       Movement disorder    -  Primary    Weakness of hip, unspecified laterality        Gait disorder                     Plan:      Issue seemed to be primarily that of ataxia and pelvic girdle weakness more than any suspicion of inner ear disease.  While certainly there may be age-related balance changes there is no history consistent with Meniere's disease, vestibular neuropathy or symptoms or physical findings consistent with vestibular weakness.  Further audiologic testing including VNG and VEMP testing are not recommended at this time but can be pursued if recommended by Neurology.      A greatest priority starting physical therapy for strengthening and fall prevention.  Referral to Neurology also provided for evaluation of a possible neurodegenerative condition.    Return with any worsening of symptoms, failure to improve, or any other concerns for further evaluation and treatment.

## 2023-02-10 NOTE — ED NOTES
Lbs reviewed by MD, no changes  Coumadin clinic updated with INR result    Message left to inform patient    Report to ZAHIRA Deal. Transport here to take patient up to floor.

## 2023-02-25 ENCOUNTER — HOSPITAL ENCOUNTER (EMERGENCY)
Facility: MEDICAL CENTER | Age: 43
End: 2023-02-25
Attending: EMERGENCY MEDICINE
Payer: MEDICAID

## 2023-02-25 VITALS
HEIGHT: 64 IN | HEART RATE: 108 BPM | BODY MASS INDEX: 24.07 KG/M2 | WEIGHT: 141 LBS | DIASTOLIC BLOOD PRESSURE: 125 MMHG | OXYGEN SATURATION: 99 % | SYSTOLIC BLOOD PRESSURE: 172 MMHG | TEMPERATURE: 98.5 F | RESPIRATION RATE: 17 BRPM

## 2023-02-25 DIAGNOSIS — Z76.0 MEDICATION REFILL: ICD-10-CM

## 2023-02-25 DIAGNOSIS — I10 PRIMARY HYPERTENSION: ICD-10-CM

## 2023-02-25 DIAGNOSIS — J02.9 VIRAL PHARYNGITIS: ICD-10-CM

## 2023-02-25 DIAGNOSIS — G93.2 PSEUDOTUMOR CEREBRI: ICD-10-CM

## 2023-02-25 PROCEDURE — A9270 NON-COVERED ITEM OR SERVICE: HCPCS | Performed by: EMERGENCY MEDICINE

## 2023-02-25 PROCEDURE — 700102 HCHG RX REV CODE 250 W/ 637 OVERRIDE(OP): Performed by: EMERGENCY MEDICINE

## 2023-02-25 PROCEDURE — 99283 EMERGENCY DEPT VISIT LOW MDM: CPT

## 2023-02-25 RX ORDER — ACETAZOLAMIDE 250 MG/1
500 TABLET ORAL 2 TIMES DAILY
Qty: 120 TABLET | Refills: 0 | Status: SHIPPED | OUTPATIENT
Start: 2023-02-25 | End: 2023-03-27

## 2023-02-25 RX ORDER — POTASSIUM CHLORIDE 20 MEQ/1
10 TABLET, EXTENDED RELEASE ORAL DAILY
Qty: 15 TABLET | Refills: 0 | Status: SHIPPED | OUTPATIENT
Start: 2023-02-25 | End: 2023-03-27

## 2023-02-25 RX ORDER — AMLODIPINE BESYLATE 5 MG/1
5 TABLET ORAL DAILY
Qty: 30 TABLET | Refills: 0 | Status: SHIPPED | OUTPATIENT
Start: 2023-02-25 | End: 2023-07-22 | Stop reason: SDUPTHER

## 2023-02-25 RX ORDER — AMLODIPINE BESYLATE 5 MG/1
5 TABLET ORAL ONCE
Status: COMPLETED | OUTPATIENT
Start: 2023-02-25 | End: 2023-02-25

## 2023-02-25 RX ORDER — POTASSIUM CHLORIDE 20 MEQ/1
20 TABLET, EXTENDED RELEASE ORAL ONCE
Status: COMPLETED | OUTPATIENT
Start: 2023-02-25 | End: 2023-02-25

## 2023-02-25 RX ADMIN — METOPROLOL TARTRATE 25 MG: 25 TABLET, FILM COATED ORAL at 19:19

## 2023-02-25 RX ADMIN — AMLODIPINE BESYLATE 5 MG: 5 TABLET ORAL at 19:19

## 2023-02-25 RX ADMIN — POTASSIUM CHLORIDE 20 MEQ: 1500 TABLET, EXTENDED RELEASE ORAL at 19:20

## 2023-02-25 ASSESSMENT — LIFESTYLE VARIABLES
HOW MANY TIMES IN THE PAST YEAR HAVE YOU HAD 5 OR MORE DRINKS IN A DAY: 0
EVER HAD A DRINK FIRST THING IN THE MORNING TO STEADY YOUR NERVES TO GET RID OF A HANGOVER: NO
CONSUMPTION TOTAL: NEGATIVE
HAVE PEOPLE ANNOYED YOU BY CRITICIZING YOUR DRINKING: NO
EVER FELT BAD OR GUILTY ABOUT YOUR DRINKING: NO
DO YOU DRINK ALCOHOL: NO
TOTAL SCORE: 0
AVERAGE NUMBER OF DAYS PER WEEK YOU HAVE A DRINK CONTAINING ALCOHOL: 0
TOTAL SCORE: 0
ON A TYPICAL DAY WHEN YOU DRINK ALCOHOL HOW MANY DRINKS DO YOU HAVE: 0
TOTAL SCORE: 0
HAVE YOU EVER FELT YOU SHOULD CUT DOWN ON YOUR DRINKING: NO

## 2023-02-25 ASSESSMENT — FIBROSIS 4 INDEX: FIB4 SCORE: 0.81

## 2023-02-26 NOTE — DISCHARGE INSTRUCTIONS
I have refilled your amlodipine your metoprolol your potassium as well as your acetazolamide.  I have only refilled them for a month.  Please follow-up with your primary care for further refills

## 2023-02-26 NOTE — ED PROVIDER NOTES
ED Provider Note    CHIEF COMPLAINT  Chief Complaint   Patient presents with    Medication Refill       EXTERNAL RECORDS REVIEWED  Inpatient Notes admitted in December 2022 for uncontrolled hypertension     HPI/ROS  LIMITATION TO HISTORY   Select: : None      Saul Conde is a 42 y.o. female who presents to the emergency department chief complaint of medication refill.  The patient states that she has uncontrolled hypertension and unfortunately has not taken any of her medications in at least 3 weeks.  She has been unable to get her meds refilled she states she was not feeling very well, like a viral illness sore throat cough congestion and just did not quite make it anywhere.  She has no chest pain dizziness headache weakness numbness or tingling.  She states she knows her blood pressure and heart rate are high that usually where she lives when she is noncompliant with her meds.    PAST MEDICAL HISTORY   has a past medical history of Hypertension, Hypertension, and Pseudotumor cerebri.    SURGICAL HISTORY   has a past surgical history that includes irrigation & debridement ortho (Right, 12/12/2021).    FAMILY HISTORY  History reviewed. No pertinent family history.    SOCIAL HISTORY  Social History     Tobacco Use    Smoking status: Never    Smokeless tobacco: Never   Vaping Use    Vaping Use: Some days    Start date: 12/1/2000    Substances: Nicotine    Devices: Pre-filled or refillable cartridge   Substance and Sexual Activity    Alcohol use: Yes     Alcohol/week: 1.2 oz     Types: 2 Cans of beer per week     Comment: occasionally     Drug use: Yes     Types: Inhaled     Comment: meth, weed    Sexual activity: Not on file       CURRENT MEDICATIONS  Home Medications       Reviewed by Maya Valdivia R.N. (Registered Nurse) on 02/25/23 at 1817  Med List Status: Not Addressed     Medication Last Dose Status   acetaZOLAMIDE SR (DIAMOX) 500 MG CAPSULE SR 12 HR  Active   amLODIPine (NORVASC) 5 MG Tab  Active  "                   ALLERGIES  Allergies   Allergen Reactions    Iodine Anaphylaxis     Contrast with Iodine    Bactrim Ds Hives     \"Causes anaphylaxis, only slower\"       PHYSICAL EXAM  VITAL SIGNS: BP (!) 191/137   Pulse (!) 118   Temp 36.3 °C (97.4 °F) (Temporal)   Resp 18   Ht 1.626 m (5' 4\")   Wt 64 kg (141 lb)   LMP 02/21/2023   SpO2 97%   BMI 24.20 kg/m²    Pulse OX: Pulse Oxygen level is normal   Constitutional: Alert in no apparent distress.  HENT: Normocephalic, Atraumatic, MMM oropharynx posterior mid uvula midline no trismus mild tonsillar exudates and mild erythema  Eyes: PERound. Conjunctiva normal, non-icteric.   Heart: Regular rate and rhythm, intact distal pulses   Lungs: Symmetrical movement, no resp distress   Abdomen: Non-tender, non-distended, normal bowel sounds  Skin: Warm, Dry, No erythema, No rash.   Neurologic: Alert and oriented, Grossly non-focal.       DIAGNOSTIC STUDIES / PROCEDURES  COURSE & MEDICAL DECISION MAKING    ED Observation Status? No; Patient does not meet criteria for ED Observation.     INITIAL ASSESSMENT, COURSE AND PLAN/ DISPOSITION AND DISCUSSIONS  Care Narrative patient presents for medication refill for her hypertensive medications as well as received a total mine for history of pseudotumor cerebri.  All of her meds refilled for a month I discussed the importance of getting her medications filled through primary care for longer and actually following up.  She is hypertensive she was tachycardic not complaining of any shortness of breath chest pain weakness numbness tingling headaches.  She was given 2 of her medications here in the ED she has evidence of viral pharyngitis she does not wish to be tested for strep she states she is actually on the mend.  Return precautions for any new worsening symptoms including headache dizziness numbness chest pain shortness of breath.        Escalation of care considered, and ultimately not performed:blood analysis and acute " inpatient care management, however at this time, the patient is most appropriate for outpatient management - according to ACEP guidelines chronic hypertension that is not currently symptomatic does not need to be evaluated in the emergency department    The patient will return for new or worsening symptoms and is stable at the time of discharge.    The patient is referred to a primary physician for blood pressure management, diabetic screening, and for all other preventative health concerns.    DISPOSITION:  Patient will be discharged home in stable condition.    FOLLOW UP:  [facility], Emergency Dept  [facility] [facility] [address] [phone]    If symptoms worsen      OUTPATIENT MEDICATIONS:  New Prescriptions    ACETAZOLAMIDE (DIAMOX) 250 MG TAB    Take 2 Tablets by mouth 2 times a day for 30 days.    METOPROLOL TARTRATE (LOPRESSOR) 25 MG TAB    Take 1 Tablet by mouth 2 times a day.    POTASSIUM CHLORIDE SA (KDUR) 20 MEQ TAB CR    Take 0.5 Tablets by mouth every day for 30 days.         FINAL DIAGNOSIS  1. Medication refill    2. Primary hypertension    3. Viral pharyngitis    4. Pseudotumor cerebri           Electronically signed by: Radha Patterson M.D., 2/25/2023 6:50 PM

## 2023-02-26 NOTE — ED NOTES
Discharge teaching and paperwork provided regarding medication refill and all questions/concerns answered. Py hypertensive but medicated prior to discharge,  assessment stable. Given information regarding home care and reasons to follow up with ED or primary MD. Patient provided amlodipine, metoprolol  Rx. Patient discharged to the care of spouse and ambulatedc out of the ED.

## 2023-02-26 NOTE — ED TRIAGE NOTES
"Chief Complaint   Patient presents with    Medication Refill     Pt ambulatory to triage for a med refill. Pt states she's been too sick to follow up with a doctor to get her medications refilled.     Pt is alert and oriented, speaking in full sentences, follows commands and responds appropriately to questions.      Pt placed in lobby. Pt educated on triage process and apologized for wait time. Pt encouraged to alert staff for any changes.     Patient and staff wearing appropriate PPE      BP (!) 191/137   Pulse (!) 118   Temp 36.3 °C (97.4 °F) (Temporal)   Resp 18   Ht 1.626 m (5' 4\")   Wt 64 kg (141 lb)   SpO2 97%       "

## 2023-07-22 ENCOUNTER — HOSPITAL ENCOUNTER (EMERGENCY)
Facility: MEDICAL CENTER | Age: 43
End: 2023-07-22
Attending: EMERGENCY MEDICINE
Payer: MEDICAID

## 2023-07-22 VITALS
BODY MASS INDEX: 28.17 KG/M2 | DIASTOLIC BLOOD PRESSURE: 123 MMHG | HEART RATE: 78 BPM | OXYGEN SATURATION: 92 % | RESPIRATION RATE: 15 BRPM | HEIGHT: 64 IN | TEMPERATURE: 97.8 F | WEIGHT: 165 LBS | SYSTOLIC BLOOD PRESSURE: 192 MMHG

## 2023-07-22 DIAGNOSIS — I10 PRIMARY HYPERTENSION: ICD-10-CM

## 2023-07-22 DIAGNOSIS — I10 HYPERTENSION, UNSPECIFIED TYPE: ICD-10-CM

## 2023-07-22 DIAGNOSIS — Z00.8 MEDICAL CLEARANCE FOR INCARCERATION: ICD-10-CM

## 2023-07-22 DIAGNOSIS — F15.10 METHAMPHETAMINE USE (HCC): ICD-10-CM

## 2023-07-22 PROCEDURE — A9270 NON-COVERED ITEM OR SERVICE: HCPCS | Mod: UD | Performed by: EMERGENCY MEDICINE

## 2023-07-22 PROCEDURE — 99283 EMERGENCY DEPT VISIT LOW MDM: CPT

## 2023-07-22 PROCEDURE — 700102 HCHG RX REV CODE 250 W/ 637 OVERRIDE(OP): Mod: UD | Performed by: EMERGENCY MEDICINE

## 2023-07-22 RX ORDER — AMLODIPINE BESYLATE 5 MG/1
5 TABLET ORAL ONCE
Status: COMPLETED | OUTPATIENT
Start: 2023-07-22 | End: 2023-07-22

## 2023-07-22 RX ORDER — AMLODIPINE BESYLATE 5 MG/1
5 TABLET ORAL DAILY
Qty: 30 TABLET | Refills: 0 | Status: SHIPPED | OUTPATIENT
Start: 2023-07-22

## 2023-07-22 RX ADMIN — METOPROLOL TARTRATE 25 MG: 25 TABLET, FILM COATED ORAL at 18:09

## 2023-07-22 RX ADMIN — AMLODIPINE BESYLATE 5 MG: 5 TABLET ORAL at 18:09

## 2023-07-22 ASSESSMENT — FIBROSIS 4 INDEX: FIB4 SCORE: 0.81

## 2023-07-22 ASSESSMENT — PAIN DESCRIPTION - PAIN TYPE: TYPE: ACUTE PAIN

## 2023-07-23 NOTE — ED NOTES
Patient OK for discharge as per discussion with ERP Dr. Berger. [place] [facility] called for patient discharge.

## 2023-07-23 NOTE — ED TRIAGE NOTES
Saul Conde  42 y.o..  Chief Complaint   Patient presents with    Medical Clearance     For [facility]    Hypertension     /120 for EMS  Asymptomatic  Hx. HTN - has not taken meds x weeks     BIB EMS and [facility] ambulatory with steady gait for above. A & O x 4, GCS 15.    States that she actively uses meth and THC.    Vitals:    07/22/23 1718   BP: (!) 207/126   Pulse: 89   Resp: 16   Temp: 36.4 °C (97.5 °F)   SpO2: 95%

## 2023-07-23 NOTE — ED NOTES
Discharge teaching and paperwork provided and all questions/concerns answered. VSS, assessment stable. Patient discharged to the care of [facility] and ambulated out of the ED.

## 2023-07-23 NOTE — ED PROVIDER NOTES
"ED Provider Note    CHIEF COMPLAINT  Chief Complaint   Patient presents with    Medical Clearance     For [facility]    Hypertension     /120 for EMS  Asymptomatic  Hx. HTN - has not taken meds x weeks       EXTERNAL RECORDS REVIEWED  Discharge summary 12/30/2022, SVT, methamphetamine use    HPI/ROS    Saul Conde is a 42 y.o. female who presents for medical clearance for incarceration with hypertension.  She has a history of hypertension and methamphetamine abuse, ran out of her blood pressure medicine weeks ago.  She has no chest pain.  No shortness of breath.  No focal weakness numbness or tingling.  She has no other acute complaints.    PAST MEDICAL HISTORY   has a past medical history of Hypertension, Hypertension, and Pseudotumor cerebri.    SURGICAL HISTORY   has a past surgical history that includes irrigation & debridement ortho (Right, 12/12/2021).    FAMILY HISTORY  History reviewed. No pertinent family history.    SOCIAL HISTORY  Social History     Tobacco Use    Smoking status: Never    Smokeless tobacco: Never   Vaping Use    Vaping Use: Some days    Start date: 12/1/2000    Substances: Nicotine    Devices: Pre-filled or refillable cartridge   Substance and Sexual Activity    Alcohol use: Yes     Alcohol/week: 1.2 oz     Types: 2 Cans of beer per week     Comment: occasionally     Drug use: Yes     Types: Inhaled     Comment: meth, weed    Sexual activity: Not on file       CURRENT MEDICATIONS  Home Medications       Reviewed by Traci Arcos R.N. (Registered Nurse) on 07/22/23 at 1718  Med List Status: Partial     Medication Last Dose Status   acetaZOLAMIDE SR (DIAMOX) 500 MG CAPSULE SR 12 HR  Active   amLODIPine (NORVASC) 5 MG Tab  Active   metoprolol tartrate (LOPRESSOR) 25 MG Tab  Active                    ALLERGIES  Allergies   Allergen Reactions    Iodine Anaphylaxis     Contrast with Iodine    Bactrim Ds Hives     \"Causes anaphylaxis, only slower\"       PHYSICAL EXAM  VITAL SIGNS: BP " "(!) 207/126   Pulse 89   Temp 36.4 °C (97.5 °F) (Temporal)   Resp 16   Ht 1.626 m (5' 4\")   Wt 74.8 kg (165 lb)   LMP 07/01/2023 (Approximate)   SpO2 95%   BMI 28.32 kg/m²    Constitutional: Well appearing patient in no acute distress.  Awake and alert, not toxic nor ill in appearance.  HENT: Normocephalic, no obvious evidence of acute trauma.  Eyes: No scleral icterus. Normal conjunctiva   Thorax & Lungs: Normal nonlabored respirations. I appreciate no wheezing, rhonchi or rales. There is normal air movement.  Upon cardiac ascultation I appreciate a regular heart rhythm and a normal rate.   Abdomen: The abdomen is not visibly distended. Upon palpation, I find it to be without tenderness.  No mass appreciated.  Skin: The exposed portions of skin reveal no obvious rash or other abnormalities.  Extremities/Musculoskeletal: No obvious sign of acute trauma. No asymmetric calf tenderness or edema.   Neurologic: Alert & oriented. No focal deficits observed.        COURSE & MEDICAL DECISION MAKING    ED Observation Status? No; Patient does not meet criteria for ED Observation.     INITIAL ASSESSMENT, COURSE AND PLAN  Care Narrative: 42-year-old female with history of methamphetamine abuse comes in for medical clearance for incarceration because of hypertension, ran out of her blood pressure medicine weeks ago, actively uses methamphetamine.  No chest pain or focal neurologic complaints.  This is essentially asymptomatic chronic hypertension in the setting of medication noncompliance and methamphetamine abuse.  I will treat her with a dose of metoprolol and amlodipine which are her normal antihypertensive agents.  I will represcribe those medications for her, she is discharged cleared for incarceration  Prescription for metoprolol and amlodipine      FINAL DIAGNOSIS  1. Hypertension, unspecified type    2. Medical clearance for incarceration    3. Methamphetamine use (HCC)    4. Primary hypertension       "     Electronically signed by: Joaquin Berger M.D., 7/22/2023 5:45 PM

## 2023-08-18 ENCOUNTER — HOSPITAL ENCOUNTER (EMERGENCY)
Facility: MEDICAL CENTER | Age: 43
End: 2023-08-18
Attending: EMERGENCY MEDICINE
Payer: MEDICAID

## 2023-08-18 VITALS
WEIGHT: 135 LBS | HEART RATE: 80 BPM | HEIGHT: 64 IN | DIASTOLIC BLOOD PRESSURE: 88 MMHG | OXYGEN SATURATION: 94 % | SYSTOLIC BLOOD PRESSURE: 140 MMHG | RESPIRATION RATE: 16 BRPM | BODY MASS INDEX: 23.05 KG/M2 | TEMPERATURE: 98.1 F

## 2023-08-18 DIAGNOSIS — R04.0 ACUTE ANTERIOR EPISTAXIS: ICD-10-CM

## 2023-08-18 DIAGNOSIS — F41.9 ANXIETY: ICD-10-CM

## 2023-08-18 DIAGNOSIS — I16.0 HYPERTENSIVE URGENCY: ICD-10-CM

## 2023-08-18 LAB
ALBUMIN SERPL BCP-MCNC: 3.6 G/DL (ref 3.2–4.9)
ALBUMIN/GLOB SERPL: 1.3 G/DL
ALP SERPL-CCNC: 80 U/L (ref 30–99)
ALT SERPL-CCNC: 13 U/L (ref 2–50)
ANION GAP SERPL CALC-SCNC: 10 MMOL/L (ref 7–16)
APTT PPP: 28.7 SEC (ref 24.7–36)
AST SERPL-CCNC: 18 U/L (ref 12–45)
BASOPHILS # BLD AUTO: 0.3 % (ref 0–1.8)
BASOPHILS # BLD: 0.03 K/UL (ref 0–0.12)
BILIRUB SERPL-MCNC: 0.2 MG/DL (ref 0.1–1.5)
BUN SERPL-MCNC: 17 MG/DL (ref 8–22)
CALCIUM ALBUM COR SERPL-MCNC: 8.8 MG/DL (ref 8.5–10.5)
CALCIUM SERPL-MCNC: 8.5 MG/DL (ref 8.5–10.5)
CHLORIDE SERPL-SCNC: 105 MMOL/L (ref 96–112)
CO2 SERPL-SCNC: 22 MMOL/L (ref 20–33)
CREAT SERPL-MCNC: 0.87 MG/DL (ref 0.5–1.4)
EOSINOPHIL # BLD AUTO: 0.24 K/UL (ref 0–0.51)
EOSINOPHIL NFR BLD: 2.7 % (ref 0–6.9)
ERYTHROCYTE [DISTWIDTH] IN BLOOD BY AUTOMATED COUNT: 44.1 FL (ref 35.9–50)
GFR SERPLBLD CREATININE-BSD FMLA CKD-EPI: 84 ML/MIN/1.73 M 2
GLOBULIN SER CALC-MCNC: 2.7 G/DL (ref 1.9–3.5)
GLUCOSE SERPL-MCNC: 102 MG/DL (ref 65–99)
HCT VFR BLD AUTO: 35.3 % (ref 37–47)
HGB BLD-MCNC: 11.6 G/DL (ref 12–16)
IMM GRANULOCYTES # BLD AUTO: 0.02 K/UL (ref 0–0.11)
IMM GRANULOCYTES NFR BLD AUTO: 0.2 % (ref 0–0.9)
INR PPP: 1.04 (ref 0.87–1.13)
LYMPHOCYTES # BLD AUTO: 3.41 K/UL (ref 1–4.8)
LYMPHOCYTES NFR BLD: 38.3 % (ref 22–41)
MCH RBC QN AUTO: 27.8 PG (ref 27–33)
MCHC RBC AUTO-ENTMCNC: 32.9 G/DL (ref 32.2–35.5)
MCV RBC AUTO: 84.7 FL (ref 81.4–97.8)
MONOCYTES # BLD AUTO: 0.75 K/UL (ref 0–0.85)
MONOCYTES NFR BLD AUTO: 8.4 % (ref 0–13.4)
NEUTROPHILS # BLD AUTO: 4.45 K/UL (ref 1.82–7.42)
NEUTROPHILS NFR BLD: 50.1 % (ref 44–72)
NRBC # BLD AUTO: 0 K/UL
NRBC BLD-RTO: 0 /100 WBC (ref 0–0.2)
PLATELET # BLD AUTO: 239 K/UL (ref 164–446)
PMV BLD AUTO: 10 FL (ref 9–12.9)
POTASSIUM SERPL-SCNC: 3.4 MMOL/L (ref 3.6–5.5)
PROT SERPL-MCNC: 6.3 G/DL (ref 6–8.2)
PROTHROMBIN TIME: 13.5 SEC (ref 12–14.6)
RBC # BLD AUTO: 4.17 M/UL (ref 4.2–5.4)
SODIUM SERPL-SCNC: 137 MMOL/L (ref 135–145)
WBC # BLD AUTO: 8.9 K/UL (ref 4.8–10.8)

## 2023-08-18 PROCEDURE — 303620 HCHG EPISTAXIS CONTROL

## 2023-08-18 PROCEDURE — 85025 COMPLETE CBC W/AUTO DIFF WBC: CPT

## 2023-08-18 PROCEDURE — 96374 THER/PROPH/DIAG INJ IV PUSH: CPT

## 2023-08-18 PROCEDURE — 85610 PROTHROMBIN TIME: CPT

## 2023-08-18 PROCEDURE — A9270 NON-COVERED ITEM OR SERVICE: HCPCS | Mod: UD | Performed by: EMERGENCY MEDICINE

## 2023-08-18 PROCEDURE — 80053 COMPREHEN METABOLIC PANEL: CPT

## 2023-08-18 PROCEDURE — 700102 HCHG RX REV CODE 250 W/ 637 OVERRIDE(OP): Mod: UD | Performed by: EMERGENCY MEDICINE

## 2023-08-18 PROCEDURE — 36415 COLL VENOUS BLD VENIPUNCTURE: CPT

## 2023-08-18 PROCEDURE — 94760 N-INVAS EAR/PLS OXIMETRY 1: CPT

## 2023-08-18 PROCEDURE — 85730 THROMBOPLASTIN TIME PARTIAL: CPT

## 2023-08-18 PROCEDURE — 99284 EMERGENCY DEPT VISIT MOD MDM: CPT

## 2023-08-18 PROCEDURE — 700101 HCHG RX REV CODE 250: Mod: UD | Performed by: EMERGENCY MEDICINE

## 2023-08-18 PROCEDURE — 700111 HCHG RX REV CODE 636 W/ 250 OVERRIDE (IP): Mod: UD | Performed by: EMERGENCY MEDICINE

## 2023-08-18 RX ORDER — LABETALOL HYDROCHLORIDE 5 MG/ML
10 INJECTION, SOLUTION INTRAVENOUS ONCE
Status: COMPLETED | OUTPATIENT
Start: 2023-08-18 | End: 2023-08-18

## 2023-08-18 RX ORDER — CEPHALEXIN 500 MG/1
500 CAPSULE ORAL ONCE
Status: COMPLETED | OUTPATIENT
Start: 2023-08-18 | End: 2023-08-18

## 2023-08-18 RX ORDER — CEPHALEXIN 500 MG/1
500 CAPSULE ORAL 4 TIMES DAILY
Qty: 40 CAPSULE | Refills: 0 | Status: ACTIVE | OUTPATIENT
Start: 2023-08-18 | End: 2023-08-18 | Stop reason: SDUPTHER

## 2023-08-18 RX ORDER — CLONIDINE HYDROCHLORIDE 0.1 MG/1
0.2 TABLET ORAL ONCE
Status: COMPLETED | OUTPATIENT
Start: 2023-08-18 | End: 2023-08-18

## 2023-08-18 RX ORDER — CEPHALEXIN 500 MG/1
500 CAPSULE ORAL 4 TIMES DAILY
Qty: 40 CAPSULE | Refills: 0 | Status: ACTIVE | OUTPATIENT
Start: 2023-08-18

## 2023-08-18 RX ADMIN — SILVER NITRATE 1 APPLICATOR: 38.21; 12.74 STICK TOPICAL at 01:45

## 2023-08-18 RX ADMIN — LIDOCAINE HYDROCHLORIDE 10 ML: 10; .005 INJECTION, SOLUTION EPIDURAL; INFILTRATION; INTRACAUDAL; PERINEURAL at 01:45

## 2023-08-18 RX ADMIN — PHENYLEPHRINE HYDROCHLORIDE 1 SPRAY: 1 SPRAY NASAL at 01:45

## 2023-08-18 RX ADMIN — CLONIDINE HYDROCHLORIDE 0.2 MG: 0.1 TABLET ORAL at 02:13

## 2023-08-18 RX ADMIN — LABETALOL HYDROCHLORIDE 10 MG: 5 INJECTION INTRAVENOUS at 01:22

## 2023-08-18 RX ADMIN — CEPHALEXIN 500 MG: 500 CAPSULE ORAL at 04:51

## 2023-08-18 ASSESSMENT — FIBROSIS 4 INDEX: FIB4 SCORE: 0.81

## 2023-08-18 NOTE — ED TRIAGE NOTES
Chief Complaint   Patient presents with    Epistaxis     Bleeding started approximately one hour ago.     Hypertension     Recently Dx with HTN; prescribed metoprolol. Has not had metoprolol available until today. Last dose early Thursday moring.  /121 upon arrival.     Pt has had success with Afrin spray in the past.

## 2023-08-18 NOTE — DISCHARGE INSTRUCTIONS
Leave the nasal packing in for the next 4 days   Take the antibiotics until the packing is removed.  No hot liquids, no nose blowing, if you have to sneeze, sneeze through your mouth.  Make sure that you are taking your blood pressure medications regularly as this is a potential cause of your nosebleed.  Get your blood pressure medications filled and take them daily.  Follow-up with [facility] or your primary care doctor in 4 days for packing removal.

## 2023-08-18 NOTE — ED PROVIDER NOTES
ER Provider Note    Scribed for Ioana Harris D.o. by Bryan Frias. 8/18/2023  1:12 AM    Primary Care Provider: Pcp Pt States None    CHIEF COMPLAINT  Chief Complaint   Patient presents with    Epistaxis     Bleeding started approximately one hour ago.     Hypertension     Recently Dx with HTN; prescribed metoprolol. Has not had metoprolol available until today. Last dose early Thursday moring.  /121 upon arrival.     EXTERNAL RECORDS REVIEWED  Other Patient was seen on 7/22/23 with hypertension during a visit for medical clearance for incarceration.    HPI/ROS  LIMITATION TO HISTORY   Select: : None  OUTSIDE HISTORIAN(S):  Significant other at bedside.    Saul Conde is a 42 y.o. female who presents to the ED complaining of a nosebleed which started roughly an hour ago. She states that this is her second nose bleed since she was noted to have hypertension.  She got her blood pressure medication filled but has not started it , her last nose bleed was last week. She states that tonight the bleeding started on her left side, and progressed to her right side.     PAST MEDICAL HISTORY  Past Medical History:   Diagnosis Date    Hypertension     Hypertension     Pseudotumor cerebri      SURGICAL HISTORY  Past Surgical History:   Procedure Laterality Date    IRRIGATION & DEBRIDEMENT ORTHO Right 12/12/2021    Procedure: IRRIGATION AND DEBRIDEMENT, WOUND;  Surgeon: Diego Vinson M.D.;  Location: SURGERY [facility];  Service: Orthopedics     FAMILY HISTORY  No family history noted.    SOCIAL HISTORY   reports that she has never smoked. She has never used smokeless tobacco. She reports current alcohol use of about 1.2 oz of alcohol per week. She reports current drug use. Drug: Inhaled.    CURRENT MEDICATIONS  Discharge Medication List as of 8/18/2023  4:42 AM        CONTINUE these medications which have NOT CHANGED    Details   amLODIPine (NORVASC) 5 MG Tab Take 1 Tablet by mouth every day., Disp-30 Tablet,  "R-0, Normal      metoprolol tartrate (LOPRESSOR) 25 MG Tab Take 1 Tablet by mouth 2 times a day., Disp-60 Tablet, R-0, Normal      acetaZOLAMIDE SR (DIAMOX) 500 MG CAPSULE SR 12 HR Take 1 Capsule by mouth 2 times a day., Disp-60 Capsule, R-0, Normal           ALLERGIES  Iodine and Bactrim ds    PHYSICAL EXAM  BP (!) 187/121   Pulse (!) 107   Temp 36.8 °C (98.3 °F) (Temporal)   Resp 18   Ht 1.626 m (5' 4\")   Wt 61.2 kg (135 lb)   LMP 07/01/2023 (Approximate)   SpO2 96%   BMI 23.17 kg/m²   Constitutional: Patient is well developed, well nourished. Non-toxic appearing.  Very anxious.  HENT: Normocephalic, atraumatic.  Patient has active bright red bleeding coming from both nostrils predominantly more on the left than the right.  She has blood in the nasopharynx as well.  Eyes: PERRL, EOMI, Conjunctiva without erythema or exudates.   Neck: Supple   Lymphatic: No lymphadenopathy noted.   Cardiovascular: Tachycardic and Regular rhythm. Hypertensive. No murmur  Thorax & Lungs: Clear and equal breath sounds with good excursion. No respiratory distress  Abdomen: Bowel sounds normal in all four quadrants. Soft,nontender  Skin: Warm, Dry, very pale.  Extremities: Peripheral pulses 4/4 No edema, No tenderness   Musculoskeletal: Normal range of motion in all major joints. No tenderness to palpation   Neurologic: Alert & oriented x 3, Normal motor function, Normal sensory function.  Psychiatric: Affect very anxious.    DIAGNOSTIC STUDIES    Labs:   Results for orders placed or performed during the hospital encounter of 08/18/23   CBC WITH DIFFERENTIAL   Result Value Ref Range    WBC 8.9 4.8 - 10.8 K/uL    RBC 4.17 (L) 4.20 - 5.40 M/uL    Hemoglobin 11.6 (L) 12.0 - 16.0 g/dL    Hematocrit 35.3 (L) 37.0 - 47.0 %    MCV 84.7 81.4 - 97.8 fL    MCH 27.8 27.0 - 33.0 pg    MCHC 32.9 32.2 - 35.5 g/dL    RDW 44.1 35.9 - 50.0 fL    Platelet Count 239 164 - 446 K/uL    MPV 10.0 9.0 - 12.9 fL    Neutrophils-Polys 50.10 44.00 - 72.00 " %    Lymphocytes 38.30 22.00 - 41.00 %    Monocytes 8.40 0.00 - 13.40 %    Eosinophils 2.70 0.00 - 6.90 %    Basophils 0.30 0.00 - 1.80 %    Immature Granulocytes 0.20 0.00 - 0.90 %    Nucleated RBC 0.00 0.00 - 0.20 /100 WBC    Neutrophils (Absolute) 4.45 1.82 - 7.42 K/uL    Lymphs (Absolute) 3.41 1.00 - 4.80 K/uL    Monos (Absolute) 0.75 0.00 - 0.85 K/uL    Eos (Absolute) 0.24 0.00 - 0.51 K/uL    Baso (Absolute) 0.03 0.00 - 0.12 K/uL    Immature Granulocytes (abs) 0.02 0.00 - 0.11 K/uL    NRBC (Absolute) 0.00 K/uL   COMP METABOLIC PANEL   Result Value Ref Range    Sodium 137 135 - 145 mmol/L    Potassium 3.4 (L) 3.6 - 5.5 mmol/L    Chloride 105 96 - 112 mmol/L    Co2 22 20 - 33 mmol/L    Anion Gap 10.0 7.0 - 16.0    Glucose 102 (H) 65 - 99 mg/dL    Bun 17 8 - 22 mg/dL    Creatinine 0.87 0.50 - 1.40 mg/dL    Calcium 8.5 8.5 - 10.5 mg/dL    Correct Calcium 8.8 8.5 - 10.5 mg/dL    AST(SGOT) 18 12 - 45 U/L    ALT(SGPT) 13 2 - 50 U/L    Alkaline Phosphatase 80 30 - 99 U/L    Total Bilirubin 0.2 0.1 - 1.5 mg/dL    Albumin 3.6 3.2 - 4.9 g/dL    Total Protein 6.3 6.0 - 8.2 g/dL    Globulin 2.7 1.9 - 3.5 g/dL    A-G Ratio 1.3 g/dL   APTT   Result Value Ref Range    APTT 28.7 24.7 - 36.0 sec   PROTHROMBIN TIME (INR)   Result Value Ref Range    PT 13.5 12.0 - 14.6 sec    INR 1.04 0.87 - 1.13   ESTIMATED GFR   Result Value Ref Range    GFR (CKD-EPI) 84 >60 mL/min/1.73 m 2        COURSE & MEDICAL DECISION MAKING     ED Observation Status? Yes; I am placing the patient in to an observation status due to a diagnostic uncertainty as well as therapeutic intensity. Patient placed in observation status at 0115 AM, 8/18/2023.     Observation plan is as follows: Blood pressure control, treatment for epistaxis, blood work, fluids.    Upon Reevaluation, the patient's condition has: Improved; and will be discharged.    Patient discharged from ED Observation status at 0437 (Time) 8/18/23 (Date).     INITIAL ASSESSMENT, COURSE AND  PLAN  Care Narrative: Patient's nose was clamped in triage and upon my evaluation she still had a moderate amount of active bleeding.  She is peers to be extremely anxious which is not helping the situation.  Her blood pressure is also quite high.  She will be treated with labetalol 10 mg IV push for her blood pressure.  Please see procedure note for nosebleed.  The bleeding was well controlled and nasal packing is in place.  She was checked multiple times and there was no recurrent bleeding.  I gave her clonidine 0.2 mg orally for her blood pressure and eventually came down to 140/88.  She is completely stable, laboratories were unremarkable other than a slightly depleted potassium at 3.4.  She was given Keflex 500 mg orally and will be given a prescription for the same for home the next 5 days.  She is to have the packing removed in 4 days with her primary care provider, return if any recurrent or worsening bleeding, make sure she takes her blood pressure medications and return if any problems or worsening in the meantime.  She is stable upon discharge.    Differential Diagnoses:  Differential diagnoses include but are not limited to acute epistaxis.     1:18 AM - Patient seen and examined at bedside. Discussed plan of care, including medication use, and labs. Patient agrees to the plan of care. The patient will be medicated with labetalol 10 mg injection, Wood-synephrine nasal spray, silver nitrate stick, and lidocaine with epinephrine. Ordered for PTT/INR, APTT, CMP, CBC w/ Diff and urine drug screen to evaluate her symptoms.    1:23 AM - I returned to patient's bedside to assist with epistaxis procedure as listed below.    2:00 AM - I returned to the patient's bedside at this time to remove the anesthesia sticks from her nose, and to cauterize and tamponade her bleeding sites.    Epistaxis Procedure Note    Indication: Bleeding    Pre-medication: phenylephrine, lidocaine with epinephrine.    Procedure: The  patient was positioned appropriately and the nares were cleared as well as possible. The bleeding site was unable to be visualized but was cauterized with silver nitrate and tamponaded with a Merocel nasal tampon.  Hemostasis was obtained.    The patient tolerated the procedure well.    Complications: None      DISPOSITION AND DISCUSSIONS  I have discussed management of the patient with the following physicians and ANDREY's:  none    Discussion of management with other QHP or appropriate source(s): Pharmacy        Escalation of care considered, and ultimately not performed: acute inpatient care management, however at this time, the patient is most appropriate for outpatient management.    Barriers to care at this time, including but not limited to: Patient does not have established PCP.     Decision tools and prescription drugs considered including, but not limited to: Antibiotics Keflex .    FINAL DIAGNOSIS  1. Acute anterior epistaxis    2. Hypertensive urgency    3. Anxiety            Bryan GIBSON (Scribe), am scribing for, and in the presence of, Ioana Harris D.O..    Electronically signed by: Bryan Frias (Mleloibbenjamin), 8/18/2023    Ioana GIBSON D.O. personally performed the services described in this documentation, as scribed by Bryan Frias in my presence, and it is both accurate and complete.    The note accurately reflects work and decisions made by me.  Ioana Harris D.O.  8/18/2023  8:33 AM

## 2023-08-18 NOTE — ED NOTES
Patient educated on discharge instructions, follow up appointments, prescriptions, and home care. Patient verbalized understanding. Patient ambulated well to [facility].

## 2023-08-26 ENCOUNTER — HOSPITAL ENCOUNTER (EMERGENCY)
Facility: MEDICAL CENTER | Age: 43
End: 2023-08-27
Payer: MEDICAID

## 2023-08-26 VITALS
BODY MASS INDEX: 23.56 KG/M2 | HEIGHT: 64 IN | SYSTOLIC BLOOD PRESSURE: 167 MMHG | WEIGHT: 138.01 LBS | DIASTOLIC BLOOD PRESSURE: 111 MMHG | RESPIRATION RATE: 18 BRPM | TEMPERATURE: 96.6 F | HEART RATE: 98 BPM | OXYGEN SATURATION: 96 %

## 2023-08-26 PROCEDURE — 302449 STATCHG TRIAGE ONLY (STATISTIC)

## 2023-08-26 ASSESSMENT — FIBROSIS 4 INDEX: FIB4 SCORE: 0.9

## 2023-08-27 NOTE — ED TRIAGE NOTES
Saul Conde  42 y.o.  Chief Complaint   Patient presents with    Headache     X 5 days  Denies alleviating/exacerbating factors  + concurrent slight dizziness  Denies recent falls/head trauma  Rates pain 8/10, mild relief with Tylenol taken at home     Ambulatory to triage with steady gait for above. A & O x 4, GCS 15. Mask in place.    Hx. Pseudotumor cerebri - states that she has NOT been taking her Diamox for the past 1-1.5 months    Triage process explained to patient, apologized for wait time, and returned to lobby.

## 2023-09-04 ENCOUNTER — APPOINTMENT (OUTPATIENT)
Dept: RADIOLOGY | Facility: MEDICAL CENTER | Age: 43
End: 2023-09-04
Attending: STUDENT IN AN ORGANIZED HEALTH CARE EDUCATION/TRAINING PROGRAM
Payer: MEDICAID

## 2023-09-04 ENCOUNTER — HOSPITAL ENCOUNTER (EMERGENCY)
Facility: MEDICAL CENTER | Age: 43
End: 2023-09-05
Attending: STUDENT IN AN ORGANIZED HEALTH CARE EDUCATION/TRAINING PROGRAM
Payer: MEDICAID

## 2023-09-04 DIAGNOSIS — R06.00 DYSPNEA, UNSPECIFIED TYPE: ICD-10-CM

## 2023-09-04 DIAGNOSIS — R51.9 NONINTRACTABLE HEADACHE, UNSPECIFIED CHRONICITY PATTERN, UNSPECIFIED HEADACHE TYPE: ICD-10-CM

## 2023-09-04 LAB
ALBUMIN SERPL BCP-MCNC: 3.8 G/DL (ref 3.2–4.9)
ALBUMIN/GLOB SERPL: 1.1 G/DL
ALP SERPL-CCNC: 106 U/L (ref 30–99)
ALT SERPL-CCNC: 11 U/L (ref 2–50)
ANION GAP SERPL CALC-SCNC: 12 MMOL/L (ref 7–16)
AST SERPL-CCNC: 17 U/L (ref 12–45)
BASOPHILS # BLD AUTO: 0.2 % (ref 0–1.8)
BASOPHILS # BLD: 0.02 K/UL (ref 0–0.12)
BILIRUB SERPL-MCNC: 0.2 MG/DL (ref 0.1–1.5)
BUN SERPL-MCNC: 14 MG/DL (ref 8–22)
CALCIUM ALBUM COR SERPL-MCNC: 10 MG/DL (ref 8.5–10.5)
CALCIUM SERPL-MCNC: 9.8 MG/DL (ref 8.5–10.5)
CHLORIDE SERPL-SCNC: 103 MMOL/L (ref 96–112)
CO2 SERPL-SCNC: 23 MMOL/L (ref 20–33)
CREAT SERPL-MCNC: 0.92 MG/DL (ref 0.5–1.4)
EOSINOPHIL # BLD AUTO: 0.03 K/UL (ref 0–0.51)
EOSINOPHIL NFR BLD: 0.3 % (ref 0–6.9)
ERYTHROCYTE [DISTWIDTH] IN BLOOD BY AUTOMATED COUNT: 40.8 FL (ref 35.9–50)
GFR SERPLBLD CREATININE-BSD FMLA CKD-EPI: 79 ML/MIN/1.73 M 2
GLOBULIN SER CALC-MCNC: 3.5 G/DL (ref 1.9–3.5)
GLUCOSE SERPL-MCNC: 98 MG/DL (ref 65–99)
HCT VFR BLD AUTO: 36.3 % (ref 37–47)
HGB BLD-MCNC: 12 G/DL (ref 12–16)
IMM GRANULOCYTES # BLD AUTO: 0.03 K/UL (ref 0–0.11)
IMM GRANULOCYTES NFR BLD AUTO: 0.3 % (ref 0–0.9)
LYMPHOCYTES # BLD AUTO: 1.49 K/UL (ref 1–4.8)
LYMPHOCYTES NFR BLD: 16.8 % (ref 22–41)
MCH RBC QN AUTO: 27.3 PG (ref 27–33)
MCHC RBC AUTO-ENTMCNC: 33.1 G/DL (ref 32.2–35.5)
MCV RBC AUTO: 82.5 FL (ref 81.4–97.8)
MONOCYTES # BLD AUTO: 0.47 K/UL (ref 0–0.85)
MONOCYTES NFR BLD AUTO: 5.3 % (ref 0–13.4)
NEUTROPHILS # BLD AUTO: 6.82 K/UL (ref 1.82–7.42)
NEUTROPHILS NFR BLD: 77.1 % (ref 44–72)
NRBC # BLD AUTO: 0 K/UL
NRBC BLD-RTO: 0 /100 WBC (ref 0–0.2)
PLATELET # BLD AUTO: 357 K/UL (ref 164–446)
PMV BLD AUTO: 9.1 FL (ref 9–12.9)
POTASSIUM SERPL-SCNC: 3.9 MMOL/L (ref 3.6–5.5)
PROT SERPL-MCNC: 7.3 G/DL (ref 6–8.2)
RBC # BLD AUTO: 4.4 M/UL (ref 4.2–5.4)
SODIUM SERPL-SCNC: 138 MMOL/L (ref 135–145)
TROPONIN T SERPL-MCNC: 8 NG/L (ref 6–19)
WBC # BLD AUTO: 8.9 K/UL (ref 4.8–10.8)

## 2023-09-04 PROCEDURE — 96375 TX/PRO/DX INJ NEW DRUG ADDON: CPT

## 2023-09-04 PROCEDURE — 93005 ELECTROCARDIOGRAM TRACING: CPT | Performed by: STUDENT IN AN ORGANIZED HEALTH CARE EDUCATION/TRAINING PROGRAM

## 2023-09-04 PROCEDURE — 84484 ASSAY OF TROPONIN QUANT: CPT

## 2023-09-04 PROCEDURE — 80053 COMPREHEN METABOLIC PANEL: CPT

## 2023-09-04 PROCEDURE — 85025 COMPLETE CBC W/AUTO DIFF WBC: CPT

## 2023-09-04 PROCEDURE — 700111 HCHG RX REV CODE 636 W/ 250 OVERRIDE (IP): Mod: UD | Performed by: STUDENT IN AN ORGANIZED HEALTH CARE EDUCATION/TRAINING PROGRAM

## 2023-09-04 PROCEDURE — 71045 X-RAY EXAM CHEST 1 VIEW: CPT

## 2023-09-04 PROCEDURE — 99284 EMERGENCY DEPT VISIT MOD MDM: CPT

## 2023-09-04 PROCEDURE — 700105 HCHG RX REV CODE 258: Mod: UD | Performed by: STUDENT IN AN ORGANIZED HEALTH CARE EDUCATION/TRAINING PROGRAM

## 2023-09-04 PROCEDURE — 36415 COLL VENOUS BLD VENIPUNCTURE: CPT

## 2023-09-04 PROCEDURE — 96374 THER/PROPH/DIAG INJ IV PUSH: CPT

## 2023-09-04 RX ORDER — DIPHENHYDRAMINE HYDROCHLORIDE 50 MG/ML
12.5 INJECTION INTRAMUSCULAR; INTRAVENOUS ONCE
Status: COMPLETED | OUTPATIENT
Start: 2023-09-04 | End: 2023-09-04

## 2023-09-04 RX ORDER — SODIUM CHLORIDE 9 MG/ML
INJECTION, SOLUTION INTRAVENOUS ONCE
Status: COMPLETED | OUTPATIENT
Start: 2023-09-04 | End: 2023-09-05

## 2023-09-04 RX ORDER — PROCHLORPERAZINE EDISYLATE 5 MG/ML
10 INJECTION INTRAMUSCULAR; INTRAVENOUS ONCE
Status: COMPLETED | OUTPATIENT
Start: 2023-09-04 | End: 2023-09-04

## 2023-09-04 RX ADMIN — PROCHLORPERAZINE EDISYLATE 10 MG: 5 INJECTION INTRAMUSCULAR; INTRAVENOUS at 22:17

## 2023-09-04 RX ADMIN — SODIUM CHLORIDE: 9 INJECTION, SOLUTION INTRAVENOUS at 22:19

## 2023-09-04 RX ADMIN — DIPHENHYDRAMINE HYDROCHLORIDE 12.5 MG: 50 INJECTION, SOLUTION INTRAMUSCULAR; INTRAVENOUS at 22:18

## 2023-09-04 ASSESSMENT — FIBROSIS 4 INDEX: FIB4 SCORE: 0.9

## 2023-09-05 VITALS
HEIGHT: 61 IN | OXYGEN SATURATION: 93 % | RESPIRATION RATE: 16 BRPM | DIASTOLIC BLOOD PRESSURE: 81 MMHG | WEIGHT: 135.14 LBS | TEMPERATURE: 97.5 F | HEART RATE: 91 BPM | SYSTOLIC BLOOD PRESSURE: 132 MMHG | BODY MASS INDEX: 25.52 KG/M2

## 2023-09-05 LAB — EKG IMPRESSION: NORMAL

## 2023-09-05 NOTE — ED NOTES
Pt ambulated to yellow 55 with a steady gait and all belongings. Patient connected to the monitor. Bed locked and in lowest position. Call light available and within reach. ERP to see.

## 2023-09-05 NOTE — ED TRIAGE NOTES
Pt to ED with complaints of headaches that started in posterior neck and goes up into back of head. Started this afternoon. She reports dizziness and shortness of breath that started at the same time. She denies fever or chills. Report she did have nasal packing 8/18 which was in for 7 days and she did NOT feel her prescribed antibiotics - she is concerned for infections.   She also reports she has not being taking Diamox which she was previously prescribed and would like a refill.

## 2023-09-05 NOTE — ED PROVIDER NOTES
ED Provider Note    CHIEF COMPLAINT  Chief Complaint   Patient presents with    Dizziness    Shortness of Breath    Headache    Medication Refill       EXTERNAL RECORDS REVIEWED  Inpatient Notes summary on 12/30/2022 for palpitations    HPI/ROS  LIMITATION TO HISTORY   Select: : None  OUTSIDE HISTORIAN(S):      Saul Conde is a 42 y.o. female who presents with headache, neck pain, lightheadedness, shortness of breath.  Patient denies chest pain.  Patient reports that she had nasal packing removed on 8/25/2023 for a nosebleed.  Patient has presented and been admitted for palpitations previously.  Patient does have a history of methamphetamine use.  Patient has no fevers or chills.  Patient has no vision changes, nausea, altered mental status or confusion.  Denies that the headache is thunderclap or the worst headache of her life.    PAST MEDICAL HISTORY   has a past medical history of Hypertension, Hypertension, and Pseudotumor cerebri.    SURGICAL HISTORY   has a past surgical history that includes irrigation & debridement ortho (Right, 12/12/2021).    FAMILY HISTORY  History reviewed. No pertinent family history.    SOCIAL HISTORY  Social History     Tobacco Use    Smoking status: Never    Smokeless tobacco: Never   Vaping Use    Vaping Use: Some days    Start date: 12/1/2000    Substances: Nicotine    Devices: Pre-filled or refillable cartridge   Substance and Sexual Activity    Alcohol use: Yes     Alcohol/week: 1.2 oz     Types: 2 Cans of beer per week     Comment: 2 -3 times per week    Drug use: Yes     Types: Inhaled     Comment: meth (inhaled)weed    Sexual activity: Not on file       CURRENT MEDICATIONS  Home Medications       Reviewed by Mahogany Armando R.N. (Registered Nurse) on 09/04/23 at 1949  Med List Status: Partial     Medication Last Dose Status   acetaZOLAMIDE SR (DIAMOX) 500 MG CAPSULE SR 12 HR  Active   amLODIPine (NORVASC) 5 MG Tab 9/4/2023 Active   cephALEXin (KEFLEX) 500 MG Cap   "Active   metoprolol tartrate (LOPRESSOR) 25 MG Tab  Active                    ALLERGIES  Allergies   Allergen Reactions    Iodine Anaphylaxis     Contrast with Iodine    Bactrim Ds Hives     \"Causes anaphylaxis, only slower\"       PHYSICAL EXAM  VITAL SIGNS: /81   Pulse 91   Temp 36.4 °C (97.5 °F) (Temporal)   Resp 16   Ht 1.549 m (5' 1\")   Wt 61.3 kg (135 lb 2.3 oz)   LMP 08/05/2023 (Approximate)   SpO2 93%   BMI 25.53 kg/m²    Vitals and nursing note reviewed.   Constitutional:       Comments: Patient is lying in bed supine, pleasant, conversant, speaking in complete sentences   HENT:      Head: Normocephalic and atraumatic.   Eyes:      Extraocular Movements: Extraocular movements intact.      Conjunctiva/sclera: Conjunctivae normal.      Pupils: Pupils are equal, round, and reactive to light.   Cardiovascular:      Pulses: Normal pulses.      Comments: HR 95  Pulmonary:      Effort: Pulmonary effort is normal. No respiratory distress.   Musculoskeletal:         General: No swelling. Normal range of motion.      Cervical back: Normal range of motion. No rigidity.  No midline C-spine tenderness to palpation, no meningismus, negative Kernig, negative Brezinski sign  Skin:     General: Skin is warm and dry.      Capillary Refill: Capillary refill takes less than 2 seconds.   Neurological:      Mental Status: Alert.  Moving all extremities, no cranial nerve deficits.      DIAGNOSTIC STUDIES / PROCEDURES  EKG  I have independently interpreted this EKG  No ischemia    LABS  Troponin negative    RADIOLOGY  I have independently interpreted the diagnostic imaging associated with this visit and am waiting the final reading from the radiologist.   My preliminary interpretation is as follows: No acute cardiopulmonary process  Radiologist interpretation: No pneumonia pneumothorax or pulmonary edema    COURSE & MEDICAL DECISION MAKING    =    INITIAL ASSESSMENT, COURSE AND PLAN  Care Narrative: CMP demonstrates " no evidence of acute kidney injury, acute electrolyte abnormality, acute liver failure, CBC demonstrates no evidence of acute anemia or leukocytosis.  EKG, troponin evaluate for ACS versus arrhythmia.  Headache cocktail, IV fluids with Benadryl, Compazine for symptom control.  Chest x-ray to evaluate for acute cardiopulmonary process such as pneumonia, pulmonary edema, pneumothorax.  Patient without evidence of meningitis, afebrile, no meningismus, headache will be treated with headache cocktail, neck pain likely related to headache.  No evidence of subarachnoid hemorrhage either.  Disposition pending.      Electronically signed by: Robert Bentley M.D., 9/4/2023 9:42 PM    Chest x-ray without acute cardiopulmonary process.  Troponin negative, EKG nonischemic without evidence of arrhythmia.  Patient reports improved symptoms following headache cocktail, headache and neck pain have resolved.  Unclear etiology of shortness of breath, possibly upper respiratory infection.    Repeat physical exam benign.  I doubt any serious emergency process at this time.  Patient and/or family, friends given strict return precautions for worsening symptoms and care instructions. They have demonstrated understanding of discharge instructions through teach back mechanism. Advised PCP follow-up in 1-2 days.  Patient/family/friend expresses understanding and agrees to plan.    This dictation has been created using voice recognition software. I am continuously working with the software to minimize the number of voice recognition errors and I have made every attempt to manually correct the errors within my dictation. However errors  related to this voice recognition software may still exist and should be interpreted within the appropriate context.     Electronically signed by: Robert Bentley M.D., 9/5/2023 3:25 AM      HYDRATION: Based on the patient's presentation of Dehydration the patient was given IV fluids. IV Hydration  was used because oral hydration was not adequate alone. Upon recheck following hydration, the patient was improved.        DISPOSITION AND DISCUSSIONS      Escalation of care considered, and ultimately not performed:acute inpatient care management, however at this time, the patient is most appropriate for outpatient management      Decision tools and prescription drugs considered including, but not limited to: PERC rule negative and HEART Score 1 .    FINAL DIAGNOSIS  1. Dyspnea, unspecified type    2. Nonintractable headache, unspecified chronicity pattern, unspecified headache type           Electronically signed by: Robert Bentley M.D., 9/4/2023 9:37 PM

## 2024-09-28 ENCOUNTER — HOSPITAL ENCOUNTER (EMERGENCY)
Facility: MEDICAL CENTER | Age: 44
End: 2024-09-28
Attending: EMERGENCY MEDICINE
Payer: MEDICAID

## 2024-09-28 ENCOUNTER — PHARMACY VISIT (OUTPATIENT)
Dept: PHARMACY | Facility: MEDICAL CENTER | Age: 44
End: 2024-09-28
Payer: COMMERCIAL

## 2024-09-28 VITALS
HEIGHT: 61 IN | BODY MASS INDEX: 25.52 KG/M2 | WEIGHT: 135.14 LBS | HEART RATE: 71 BPM | DIASTOLIC BLOOD PRESSURE: 113 MMHG | SYSTOLIC BLOOD PRESSURE: 158 MMHG | RESPIRATION RATE: 16 BRPM | TEMPERATURE: 98.1 F | OXYGEN SATURATION: 98 %

## 2024-09-28 DIAGNOSIS — Z91.148 NONCOMPLIANCE WITH MEDICATION REGIMEN: ICD-10-CM

## 2024-09-28 DIAGNOSIS — G93.2 PSEUDOTUMOR CEREBRI: ICD-10-CM

## 2024-09-28 DIAGNOSIS — I10 PRIMARY HYPERTENSION: ICD-10-CM

## 2024-09-28 DIAGNOSIS — Z76.0 MEDICATION REFILL: ICD-10-CM

## 2024-09-28 LAB
ANION GAP SERPL CALC-SCNC: 13 MMOL/L (ref 7–16)
BASOPHILS # BLD AUTO: 0.2 % (ref 0–1.8)
BASOPHILS # BLD: 0.02 K/UL (ref 0–0.12)
BUN SERPL-MCNC: 11 MG/DL (ref 8–22)
CALCIUM SERPL-MCNC: 9.8 MG/DL (ref 8.5–10.5)
CHLORIDE SERPL-SCNC: 101 MMOL/L (ref 96–112)
CO2 SERPL-SCNC: 26 MMOL/L (ref 20–33)
CREAT SERPL-MCNC: 0.76 MG/DL (ref 0.5–1.4)
EOSINOPHIL # BLD AUTO: 0.14 K/UL (ref 0–0.51)
EOSINOPHIL NFR BLD: 1.5 % (ref 0–6.9)
ERYTHROCYTE [DISTWIDTH] IN BLOOD BY AUTOMATED COUNT: 40.8 FL (ref 35.9–50)
GFR SERPLBLD CREATININE-BSD FMLA CKD-EPI: 98 ML/MIN/1.73 M 2
GLUCOSE SERPL-MCNC: 93 MG/DL (ref 65–99)
HCT VFR BLD AUTO: 44.4 % (ref 37–47)
HGB BLD-MCNC: 14.9 G/DL (ref 12–16)
IMM GRANULOCYTES # BLD AUTO: 0.04 K/UL (ref 0–0.11)
IMM GRANULOCYTES NFR BLD AUTO: 0.4 % (ref 0–0.9)
LYMPHOCYTES # BLD AUTO: 1.68 K/UL (ref 1–4.8)
LYMPHOCYTES NFR BLD: 17.7 % (ref 22–41)
MCH RBC QN AUTO: 28.7 PG (ref 27–33)
MCHC RBC AUTO-ENTMCNC: 33.6 G/DL (ref 32.2–35.5)
MCV RBC AUTO: 85.4 FL (ref 81.4–97.8)
MONOCYTES # BLD AUTO: 0.61 K/UL (ref 0–0.85)
MONOCYTES NFR BLD AUTO: 6.4 % (ref 0–13.4)
NEUTROPHILS # BLD AUTO: 6.99 K/UL (ref 1.82–7.42)
NEUTROPHILS NFR BLD: 73.8 % (ref 44–72)
NRBC # BLD AUTO: 0 K/UL
NRBC BLD-RTO: 0 /100 WBC (ref 0–0.2)
PLATELET # BLD AUTO: 283 K/UL (ref 164–446)
PMV BLD AUTO: 9.2 FL (ref 9–12.9)
POTASSIUM SERPL-SCNC: 3.3 MMOL/L (ref 3.6–5.5)
RBC # BLD AUTO: 5.2 M/UL (ref 4.2–5.4)
SODIUM SERPL-SCNC: 140 MMOL/L (ref 135–145)
TROPONIN T SERPL-MCNC: 10 NG/L (ref 6–19)
WBC # BLD AUTO: 9.5 K/UL (ref 4.8–10.8)

## 2024-09-28 PROCEDURE — 96375 TX/PRO/DX INJ NEW DRUG ADDON: CPT

## 2024-09-28 PROCEDURE — 700111 HCHG RX REV CODE 636 W/ 250 OVERRIDE (IP): Mod: JZ,UD | Performed by: EMERGENCY MEDICINE

## 2024-09-28 PROCEDURE — 96374 THER/PROPH/DIAG INJ IV PUSH: CPT

## 2024-09-28 PROCEDURE — 700102 HCHG RX REV CODE 250 W/ 637 OVERRIDE(OP): Mod: UD | Performed by: EMERGENCY MEDICINE

## 2024-09-28 PROCEDURE — A9270 NON-COVERED ITEM OR SERVICE: HCPCS | Mod: UD | Performed by: EMERGENCY MEDICINE

## 2024-09-28 PROCEDURE — 36415 COLL VENOUS BLD VENIPUNCTURE: CPT

## 2024-09-28 PROCEDURE — 99285 EMERGENCY DEPT VISIT HI MDM: CPT

## 2024-09-28 PROCEDURE — 84484 ASSAY OF TROPONIN QUANT: CPT

## 2024-09-28 PROCEDURE — 85025 COMPLETE CBC W/AUTO DIFF WBC: CPT

## 2024-09-28 PROCEDURE — 80048 BASIC METABOLIC PNL TOTAL CA: CPT

## 2024-09-28 PROCEDURE — RXMED WILLOW AMBULATORY MEDICATION CHARGE: Performed by: EMERGENCY MEDICINE

## 2024-09-28 RX ORDER — DIPHENHYDRAMINE HYDROCHLORIDE 50 MG/ML
25 INJECTION INTRAMUSCULAR; INTRAVENOUS ONCE
Status: COMPLETED | OUTPATIENT
Start: 2024-09-28 | End: 2024-09-28

## 2024-09-28 RX ORDER — METOPROLOL TARTRATE 25 MG/1
25 TABLET, FILM COATED ORAL ONCE
Status: COMPLETED | OUTPATIENT
Start: 2024-09-28 | End: 2024-09-28

## 2024-09-28 RX ORDER — ACETAZOLAMIDE 500 MG/1
500 CAPSULE, EXTENDED RELEASE ORAL ONCE
Status: COMPLETED | OUTPATIENT
Start: 2024-09-28 | End: 2024-09-28

## 2024-09-28 RX ORDER — ACETAZOLAMIDE 500 MG/1
500 CAPSULE, EXTENDED RELEASE ORAL 2 TIMES DAILY
Qty: 60 CAPSULE | Refills: 0 | Status: SHIPPED | OUTPATIENT
Start: 2024-09-28

## 2024-09-28 RX ORDER — AMLODIPINE BESYLATE 5 MG/1
5 TABLET ORAL DAILY
Qty: 30 TABLET | Refills: 0 | Status: SHIPPED | OUTPATIENT
Start: 2024-09-28

## 2024-09-28 RX ORDER — AMLODIPINE BESYLATE 5 MG/1
5 TABLET ORAL ONCE
Status: COMPLETED | OUTPATIENT
Start: 2024-09-28 | End: 2024-09-28

## 2024-09-28 RX ORDER — PROCHLORPERAZINE EDISYLATE 5 MG/ML
10 INJECTION INTRAMUSCULAR; INTRAVENOUS ONCE
Status: COMPLETED | OUTPATIENT
Start: 2024-09-28 | End: 2024-09-28

## 2024-09-28 RX ORDER — METOPROLOL TARTRATE 25 MG/1
25 TABLET, FILM COATED ORAL 2 TIMES DAILY
Qty: 60 TABLET | Refills: 0 | Status: SHIPPED | OUTPATIENT
Start: 2024-09-28

## 2024-09-28 RX ORDER — DILTIAZEM HYDROCHLORIDE 5 MG/ML
10 INJECTION INTRAVENOUS ONCE
Status: COMPLETED | OUTPATIENT
Start: 2024-09-28 | End: 2024-09-28

## 2024-09-28 RX ADMIN — DILTIAZEM HYDROCHLORIDE 10 MG: 5 INJECTION, SOLUTION INTRAVENOUS at 04:53

## 2024-09-28 RX ADMIN — ACETAZOLAMIDE EXTENDED-RELEASE 500 MG: 500 CAPSULE ORAL at 10:31

## 2024-09-28 RX ADMIN — METOPROLOL TARTRATE 25 MG: 25 TABLET, FILM COATED ORAL at 07:25

## 2024-09-28 RX ADMIN — METOPROLOL TARTRATE 25 MG: 25 TABLET, FILM COATED ORAL at 09:33

## 2024-09-28 RX ADMIN — DIPHENHYDRAMINE HYDROCHLORIDE 25 MG: 50 INJECTION, SOLUTION INTRAMUSCULAR; INTRAVENOUS at 09:33

## 2024-09-28 RX ADMIN — PROCHLORPERAZINE EDISYLATE 10 MG: 5 INJECTION INTRAMUSCULAR; INTRAVENOUS at 09:33

## 2024-09-28 RX ADMIN — AMLODIPINE BESYLATE 5 MG: 5 TABLET ORAL at 07:25

## 2024-09-28 ASSESSMENT — FIBROSIS 4 INDEX: FIB4 SCORE: 0.65

## 2024-09-28 NOTE — ED NOTES
Bedside report received from ZAHIRA Zuñiga. Assumed care of patient and she is resting, denies any pain or needs. Bed locked and in lowest position. Call light available and within reach. No oxygen requirements at this time. Appropriate fall precautions in place. Patient on cardiac monitoring, automatic BP and pulse oximeter. See MAR for medications and infusions. No distress noted.

## 2024-09-28 NOTE — ED TRIAGE NOTES
"Chief Complaint   Patient presents with    Headache     Posterior headache started 2 days ago. (-) NV. Denies blurring of vision.     History of HTN, non compliant with her medications.    Aox4. GCS 15. Room air.    Last methamphetamine use was 2 days ago.    BP (!) 224/134   Pulse 93   Temp 36.1 °C (97 °F) (Temporal)   Resp 20   Ht 1.549 m (5' 1\")   Wt 61.3 kg (135 lb 2.3 oz)   LMP 08/07/2024   SpO2 94%   BMI 25.53 kg/m²     "

## 2024-09-28 NOTE — ED PROVIDER NOTES
"ED Provider Note    CHIEF COMPLAINT  Chief Complaint   Patient presents with    Headache     Posterior headache started 2 days ago. (-) NV. Denies blurring of vision.       EXTERNAL RECORDS REVIEWED  Patient was seen here September 4 with headache and dizziness requesting a refill.  She was seen in the emergency department previous to that with epistaxis.  Noted to be hypertensive with a blood pressure of 187/121.  Prior to that patient was seen in the emergency department needing medical clearance for incarceration.  Her blood pressure was 198/120.  Noted to be on amlodipine and a beta-blocker.    She was admitted to the hospital in 2022 for palpitations history of methamphetamine use.  Noted to have a normal EF.    HPI/ROS  LIMITATION TO HISTORY   Select: : None  OUTSIDE HISTORIAN(S):  EMS run record noted.  Blood pressure 207/147, heart rate 101, 95% on room air, GCS 15.  No interventions in route.    Saul Conde is a 44 y.o. female who presents to the emergency department via EMS.  Patient presents with a headache.  She has a long history of headaches and methamphetamine use.  She states that she takes 2 medications for blood pressure but she \"ran out\".  This has been a typical presentation of hers.  She denies any history of stroke or heart attack.  She denies nausea, vomiting, changes in bowel habits or urinary complaints.  She denies any visual changes.  She reports that her headache is near completely gone now.  She does continue to use methamphetamines although she reports \"not as much\".  She also uses alcohol.  No recent trauma or falls.  No fever cough or cold symptoms.    PAST MEDICAL HISTORY   has a past medical history of Hypertension, Hypertension, and Pseudotumor cerebri.    SURGICAL HISTORY   has a past surgical history that includes irrigation & debridement ortho (Right, 12/12/2021).    FAMILY HISTORY  History reviewed. No pertinent family history.    SOCIAL HISTORY  Social History " "    Tobacco Use    Smoking status: Never    Smokeless tobacco: Never   Vaping Use    Vaping status: Some Days    Start date: 12/1/2000    Substances: Nicotine    Devices: Pre-filled or refillable cartridge   Substance and Sexual Activity    Alcohol use: Yes     Alcohol/week: 1.2 oz     Types: 2 Cans of beer per week     Comment: 2 -3 times per week    Drug use: Yes     Types: Inhaled     Comment: meth (inhaled)weed    Sexual activity: Not on file       CURRENT MEDICATIONS  Home Medications       Reviewed by Caro Martin R.N. (Registered Nurse) on 09/28/24 at 0403  Med List Status: Partial     Medication Last Dose Status   acetaZOLAMIDE SR (DIAMOX) 500 MG CAPSULE SR 12 HR  Active   amLODIPine (NORVASC) 5 MG Tab  Active   cephALEXin (KEFLEX) 500 MG Cap  Active   metoprolol tartrate (LOPRESSOR) 25 MG Tab  Active                    ALLERGIES  Allergies   Allergen Reactions    Iodine Anaphylaxis     Contrast with Iodine    Bactrim Ds Hives     \"Causes anaphylaxis, only slower\"       PHYSICAL EXAM  VITAL SIGNS: BP (!) 167/102   Pulse 69   Temp 36.1 °C (97 °F) (Temporal)   Resp 14   Ht 1.549 m (5' 1\")   Wt 61.3 kg (135 lb 2.3 oz)   LMP 08/07/2024   SpO2 98%   BMI 25.53 kg/m²    Vitals reviewed.  Constitutional: Patient is oriented to person, place, and time. Appears well-developed and well-nourished. No distress.    Head: Normocephalic and atraumatic.   Mouth/Throat: Oropharynx is clear  Eyes: Conjunctivae are normal. Pupils are equal, round, and reactive to light.   Neck: Normal range of motion. Neck supple. No meningeal signs.  Cardiovascular: Normal rate, regular rhythm and normal heart sounds.   Pulmonary/Chest: Effort normal and breath sounds normal. No respiratory distress, no wheezes, rhonchi, or rales.   Abdominal: Soft. Bowel sounds are normal. There is no tenderness  Musculoskeletal: No edema and no tenderness.   Neurological: No cranial nerve deficits. Normal motor and sensory exam. No focal deficits. " Normal Gait.  Skin: Skin is warm and dry. No erythema. No pallor.   Psychiatric: Patient has a normal mood and affect.     EKG/LABS  Results for orders placed or performed during the hospital encounter of 09/28/24   CBC WITH DIFFERENTIAL   Result Value Ref Range    WBC 9.5 4.8 - 10.8 K/uL    RBC 5.20 4.20 - 5.40 M/uL    Hemoglobin 14.9 12.0 - 16.0 g/dL    Hematocrit 44.4 37.0 - 47.0 %    MCV 85.4 81.4 - 97.8 fL    MCH 28.7 27.0 - 33.0 pg    MCHC 33.6 32.2 - 35.5 g/dL    RDW 40.8 35.9 - 50.0 fL    Platelet Count 283 164 - 446 K/uL    MPV 9.2 9.0 - 12.9 fL    Neutrophils-Polys 73.80 (H) 44.00 - 72.00 %    Lymphocytes 17.70 (L) 22.00 - 41.00 %    Monocytes 6.40 0.00 - 13.40 %    Eosinophils 1.50 0.00 - 6.90 %    Basophils 0.20 0.00 - 1.80 %    Immature Granulocytes 0.40 0.00 - 0.90 %    Nucleated RBC 0.00 0.00 - 0.20 /100 WBC    Neutrophils (Absolute) 6.99 1.82 - 7.42 K/uL    Lymphs (Absolute) 1.68 1.00 - 4.80 K/uL    Monos (Absolute) 0.61 0.00 - 0.85 K/uL    Eos (Absolute) 0.14 0.00 - 0.51 K/uL    Baso (Absolute) 0.02 0.00 - 0.12 K/uL    Immature Granulocytes (abs) 0.04 0.00 - 0.11 K/uL    NRBC (Absolute) 0.00 K/uL   Basic Metabolic Panel   Result Value Ref Range    Sodium 140 135 - 145 mmol/L    Potassium 3.3 (L) 3.6 - 5.5 mmol/L    Chloride 101 96 - 112 mmol/L    Co2 26 20 - 33 mmol/L    Glucose 93 65 - 99 mg/dL    Bun 11 8 - 22 mg/dL    Creatinine 0.76 0.50 - 1.40 mg/dL    Calcium 9.8 8.5 - 10.5 mg/dL    Anion Gap 13.0 7.0 - 16.0   TROPONIN   Result Value Ref Range    Troponin T 10 6 - 19 ng/L   ESTIMATED GFR   Result Value Ref Range    GFR (CKD-EPI) 98 >60 mL/min/1.73 m 2     I have independently interpreted this EKG    RADIOLOGY/PROCEDURES     COURSE & MEDICAL DECISION MAKING    ASSESSMENT, COURSE AND PLAN  Care Narrative:   This is a pleasant, 44-year-old female.  She is homeless.  She has a long history of headaches, uncontrolled hypertension, noncompliance with her medications and methamphetamine use.  She  presents with significantly elevated blood pressure slightly improved on my initial evaluation.  She looks comfortable.  She does not appear to be in any distress.  Other than her elevated blood pressure, her vital signs are reassuring.  There is no increased work of breathing.  She is afebrile.  She reports near resolution of her headache.  She reports running out of her medications.  I discussed with her, assessment of endorgan damage.  Will establish an IV, assess kidney function and cardiac strain.    7:12 AM patient is reevaluated at the bedside.  She reports marked improvement in her headache.  BP is improved but still elevated.  Will give normal oral medications.  Labs are reassuring.  She has a mild decrease in her potassium, 3.3.  The remainder of her chemistry is reassuring.  Troponin is normal 10.  CBC shows mild shift otherwise reassuring.    9 AM patient is reevaluated at the bedside.  Her blood pressure has come down marginally.  She previously reported no headache, and then she states she took a nap, woke up and was moving around and now she is having return of her neck pain.  In the past when she has presented with the symptoms, she has improved with Benadryl and Compazine.    10:44 AM patient is reevaluated at the bedside.  She is resting and appears comfortable.  She has eaten.  Her blood pressure has come down.  She no longer has a headache.  She has no neurologic deficits.  I asked her if there was anything I could do to help her ensure that she gets her medications and then of course takes her medications.  She assures me that she has Medicaid and should be able to fill them.  Prescription sent to our pharmacy and patient assures me that she can walk over there after discharge    ADDITIONAL PROBLEMS MANAGED    DISPOSITION AND DISCUSSIONS  I have discussed management of the patient with the following physicians and ANDREY's:  None    Discussion of management with other [facility] or appropriate source(s):  None     Escalation of care considered, and ultimately not performed:acute inpatient care management, however at this time, the patient is most appropriate for outpatient management    Barriers to care at this time, including but not limited to: Patient does not have established PCP, Patient had difficult affording medications, and Patient lacks financial resources.     Decision tools and prescription drugs considered including, but not limited to: None.    FINAL DIAGNOSIS  1. Primary hypertension    2. Medication refill    3. Noncompliance with medication regimen    4. Pseudotumor cerebri         Electronically signed by: Khloe Rodriguez D.O., 9/28/2024 4:09 AM

## 2024-09-28 NOTE — ED NOTES
Bedside report received from off going RN Caro, assumed care of patient.  POC discussed with patient. Call light within reach, all needs addressed at this time.       Fall risk interventions in place: Not Applicable (all applicable per [facility] Fall risk assessment)   Continuous monitoring: Cardiac Leads, Pulse Ox, or Blood Pressure  IVF/IV medications: Not Applicable   Oxygen: Room Air  Bedside sitter: Not Applicable   Isolation: Not Applicable

## (undated) DEVICE — TUBING CLEARLINK DUO-VENT - C-FLO (48EA/CA)

## (undated) DEVICE — SLEEVE VASO CALF MED - (10PR/CA)

## (undated) DEVICE — SUCTION INSTRUMENT YANKAUER BULBOUS TIP W/O VENT (50EA/CA)

## (undated) DEVICE — SENSOR OXIMETER ADULT SPO2 RD SET (20EA/BX)

## (undated) DEVICE — SET EXTENSION WITH 2 PORTS (48EA/CA) ***PART #2C8610 IS A SUBSTITUTE*****

## (undated) DEVICE — GOWN WARMING STANDARD FLEX - (30/CA)

## (undated) DEVICE — GLOVE BIOGEL SZ 8 SURGICAL PF LTX - (50PR/BX 4BX/CA)

## (undated) DEVICE — NEPTUNE 4 PORT MANIFOLD - (20/PK)

## (undated) DEVICE — DRAPE 36X28IN RAD CARM BND BG - (25/CA) O

## (undated) DEVICE — CHLORAPREP 26 ML APPLICATOR - ORANGE TINT(25/CA)

## (undated) DEVICE — CANISTER SUCTION 3000ML MECHANICAL FILTER AUTO SHUTOFF MEDI-VAC NONSTERILE LF DISP  (40EA/CA)

## (undated) DEVICE — SUTURE 0 VICRYL PLUS CT-1 - 8 X 18 INCH (12/BX)

## (undated) DEVICE — STOCKINET TUBULAR 6IN STERILE - 6 X 48YDS (25/CA)

## (undated) DEVICE — ELECTRODE DUAL RETURN W/ CORD - (50/PK)

## (undated) DEVICE — LACTATED RINGERS INJ 1000 ML - (14EA/CA 60CA/PF)

## (undated) DEVICE — BLADE SURGICAL #15 - (50/BX 3BX/CA)

## (undated) DEVICE — SUTURE GENERAL

## (undated) DEVICE — PAD LAP STERILE 18 X 18 - (5/PK 40PK/CA)

## (undated) DEVICE — PROTECTOR ULNA NERVE - (36PR/CA)

## (undated) DEVICE — SODIUM CHL IRRIGATION 0.9% 1000ML (12EA/CA)

## (undated) DEVICE — COVER LIGHT HANDLE ALC PLUS DISP (18EA/BX)

## (undated) DEVICE — WRAP COBAN SELF-ADHERENT 6 IN X  5YDS STERILE TAN (12/CA)

## (undated) DEVICE — SLEEVE, VASO, THIGH, MED

## (undated) DEVICE — GLOVE BIOGEL INDICATOR SZ 8 SURGICAL PF LTX - (50/BX 4BX/CA)

## (undated) DEVICE — SET LEADWIRE 5 LEAD BEDSIDE DISPOSABLE ECG (1SET OF 5/EA)

## (undated) DEVICE — SYRINGE EAR/NOSE 3 OZ STERILE (50/CA

## (undated) DEVICE — GLOVE BIOGEL SZ 7.5 SURGICAL PF LTX - (50PR/BX 4BX/CA)

## (undated) DEVICE — BANDAGE ELASTIC 6 HONEYCOMB - 6X5YD LF (20/CA)"

## (undated) DEVICE — GLOVE SZ 7 BIOGEL PI MICRO - PF LF (50PR/BX 4BX/CA)

## (undated) DEVICE — GLOVE BIOGEL PI ORTHO SZ 7.5 PF LF (40PR/BX)

## (undated) DEVICE — PACK MAJOR BASIN - (2EA/CA)

## (undated) DEVICE — PACK LOWER EXTREMITY - (2/CA)

## (undated) DEVICE — HEAD HOLDER JUNIOR/ADULT

## (undated) DEVICE — SWAB ANAEROBIC SPEC.COLLECTOR - (25/PK 4PK/CA 100EA/CA)

## (undated) DEVICE — PADDING CAST 6 IN STERILE - 6 X 4 YDS (24/CA)

## (undated) DEVICE — BANDAGE STERILE 2 IN X 75 IN (12EA/BX 8BX/CA)

## (undated) DEVICE — PADDING CAST 4 IN STERILE - 4 X 4 YDS (24/CA)

## (undated) DEVICE — TOWEL STOP TIMEOUT SAFETY FLAG (40EA/CA)

## (undated) DEVICE — BANDAGE ELASTIC 4 HONEYCOMB - 4"X5YD LF (20/CA)"

## (undated) DEVICE — SUTURE 2-0 VICRYL PLUS CT-1 - 8 X 18 INCH(12/BX)

## (undated) DEVICE — BANDAGEESMARK BLUE 6X9' LF - 20/CS"

## (undated) DEVICE — KIT ANESTHESIA W/CIRCUIT & 3/LT BAG W/FILTER (20EA/CA)

## (undated) DEVICE — ELECTRODE 850 FOAM ADHESIVE - HYDROGEL RADIOTRNSPRNT (50/PK)

## (undated) DEVICE — SENSOR SPO2 NEO LNCS ADHESIVE (20/BX) SEE USER NOTES

## (undated) DEVICE — MASK ANESTHESIA ADULT  - (100/CA)

## (undated) DEVICE — SUTURE ETHILON 2-0 FSLX 30 (36PK/BX)"